# Patient Record
Sex: MALE | Race: WHITE | NOT HISPANIC OR LATINO | Employment: OTHER | URBAN - NONMETROPOLITAN AREA
[De-identification: names, ages, dates, MRNs, and addresses within clinical notes are randomized per-mention and may not be internally consistent; named-entity substitution may affect disease eponyms.]

---

## 2022-08-07 ENCOUNTER — APPOINTMENT (OUTPATIENT)
Dept: GENERAL RADIOLOGY | Facility: HOSPITAL | Age: 82
End: 2022-08-07

## 2022-08-07 ENCOUNTER — HOSPITAL ENCOUNTER (INPATIENT)
Facility: HOSPITAL | Age: 82
LOS: 3 days | Discharge: HOME | End: 2022-08-10
Attending: INTERNAL MEDICINE | Admitting: FAMILY MEDICINE

## 2022-08-07 DIAGNOSIS — Z78.9 IMPAIRED MOBILITY AND ADLS: ICD-10-CM

## 2022-08-07 DIAGNOSIS — R62.7 FAILURE TO THRIVE IN ADULT: ICD-10-CM

## 2022-08-07 DIAGNOSIS — Z74.09 IMPAIRED MOBILITY AND ADLS: ICD-10-CM

## 2022-08-07 DIAGNOSIS — R41.89 IMPAIRED COGNITION: Primary | ICD-10-CM

## 2022-08-07 DIAGNOSIS — Z74.09 IMPAIRED MOBILITY: ICD-10-CM

## 2022-08-07 DIAGNOSIS — K92.2 GASTROINTESTINAL HEMORRHAGE, UNSPECIFIED GASTROINTESTINAL HEMORRHAGE TYPE: ICD-10-CM

## 2022-08-07 PROBLEM — N19 RENAL FAILURE: Status: ACTIVE | Noted: 2022-08-07

## 2022-08-07 PROBLEM — U07.1 LAB TEST POSITIVE FOR DETECTION OF COVID-19 VIRUS: Status: ACTIVE | Noted: 2022-08-07

## 2022-08-07 PROBLEM — E83.42 HYPOMAGNESEMIA: Status: ACTIVE | Noted: 2022-08-07

## 2022-08-07 PROBLEM — Z86.79 HX OF ATRIAL FIBRILLATION, NO CURRENT MEDICATION: Status: ACTIVE | Noted: 2022-08-07

## 2022-08-07 LAB
ALBUMIN SERPL-MCNC: 3.1 G/DL (ref 3.5–5.2)
ALBUMIN/GLOB SERPL: 1 G/DL
ALP SERPL-CCNC: 98 U/L (ref 39–117)
ALT SERPL W P-5'-P-CCNC: 19 U/L (ref 1–41)
ANION GAP SERPL CALCULATED.3IONS-SCNC: 13 MMOL/L (ref 5–15)
AST SERPL-CCNC: 13 U/L (ref 1–40)
B PARAPERT DNA SPEC QL NAA+PROBE: NOT DETECTED
B PERT DNA SPEC QL NAA+PROBE: NOT DETECTED
BACTERIA UR QL AUTO: ABNORMAL /HPF
BASOPHILS # BLD AUTO: 0.04 10*3/MM3 (ref 0–0.2)
BASOPHILS NFR BLD AUTO: 0.2 % (ref 0–1.5)
BILIRUB SERPL-MCNC: 0.4 MG/DL (ref 0–1.2)
BILIRUB UR QL STRIP: NEGATIVE
BUN SERPL-MCNC: 96 MG/DL (ref 8–23)
BUN/CREAT SERPL: 56.8 (ref 7–25)
C PNEUM DNA NPH QL NAA+NON-PROBE: NOT DETECTED
CALCIUM SPEC-SCNC: 9.5 MG/DL (ref 8.6–10.5)
CHLORIDE SERPL-SCNC: 109 MMOL/L (ref 98–107)
CLARITY UR: CLEAR
CO2 SERPL-SCNC: 20 MMOL/L (ref 22–29)
COLOR UR: ABNORMAL
CREAT SERPL-MCNC: 1.69 MG/DL (ref 0.76–1.27)
D-LACTATE SERPL-SCNC: 2.4 MMOL/L (ref 0.5–2)
D-LACTATE SERPL-SCNC: 2.5 MMOL/L (ref 0.5–2)
D-LACTATE SERPL-SCNC: 2.6 MMOL/L (ref 0.5–2)
D-LACTATE SERPL-SCNC: 2.9 MMOL/L (ref 0.5–2)
D-LACTATE SERPL-SCNC: 3.8 MMOL/L (ref 0.5–2)
DEPRECATED RDW RBC AUTO: 43.7 FL (ref 37–54)
EGFRCR SERPLBLD CKD-EPI 2021: 40 ML/MIN/1.73
EOSINOPHIL # BLD AUTO: 0.01 10*3/MM3 (ref 0–0.4)
EOSINOPHIL NFR BLD AUTO: 0.1 % (ref 0.3–6.2)
ERYTHROCYTE [DISTWIDTH] IN BLOOD BY AUTOMATED COUNT: 13.2 % (ref 12.3–15.4)
FLUAV SUBTYP SPEC NAA+PROBE: NOT DETECTED
FLUBV RNA ISLT QL NAA+PROBE: NOT DETECTED
GLOBULIN UR ELPH-MCNC: 3.1 GM/DL
GLUCOSE SERPL-MCNC: 172 MG/DL (ref 65–99)
GLUCOSE UR STRIP-MCNC: NEGATIVE MG/DL
HADV DNA SPEC NAA+PROBE: NOT DETECTED
HCOV 229E RNA SPEC QL NAA+PROBE: NOT DETECTED
HCOV HKU1 RNA SPEC QL NAA+PROBE: NOT DETECTED
HCOV NL63 RNA SPEC QL NAA+PROBE: NOT DETECTED
HCOV OC43 RNA SPEC QL NAA+PROBE: NOT DETECTED
HCT VFR BLD AUTO: 35.2 % (ref 37.5–51)
HGB BLD-MCNC: 11.9 G/DL (ref 13–17.7)
HGB UR QL STRIP.AUTO: NEGATIVE
HMPV RNA NPH QL NAA+NON-PROBE: NOT DETECTED
HPIV1 RNA ISLT QL NAA+PROBE: NOT DETECTED
HPIV2 RNA SPEC QL NAA+PROBE: NOT DETECTED
HPIV3 RNA NPH QL NAA+PROBE: NOT DETECTED
HPIV4 P GENE NPH QL NAA+PROBE: NOT DETECTED
HYALINE CASTS UR QL AUTO: ABNORMAL /LPF
IMM GRANULOCYTES # BLD AUTO: 0.15 10*3/MM3 (ref 0–0.05)
IMM GRANULOCYTES NFR BLD AUTO: 0.9 % (ref 0–0.5)
IRON 24H UR-MRATE: 114 MCG/DL (ref 59–158)
IRON SATN MFR SERPL: 44 % (ref 20–50)
KETONES UR QL STRIP: ABNORMAL
LEUKOCYTE ESTERASE UR QL STRIP.AUTO: ABNORMAL
LYMPHOCYTES # BLD AUTO: 0.86 10*3/MM3 (ref 0.7–3.1)
LYMPHOCYTES NFR BLD AUTO: 5.1 % (ref 19.6–45.3)
M PNEUMO IGG SER IA-ACNC: NOT DETECTED
MAGNESIUM SERPL-MCNC: 1.5 MG/DL (ref 1.6–2.4)
MCH RBC QN AUTO: 31.1 PG (ref 26.6–33)
MCHC RBC AUTO-ENTMCNC: 33.8 G/DL (ref 31.5–35.7)
MCV RBC AUTO: 91.9 FL (ref 79–97)
MONOCYTES # BLD AUTO: 0.76 10*3/MM3 (ref 0.1–0.9)
MONOCYTES NFR BLD AUTO: 4.5 % (ref 5–12)
NEUTROPHILS NFR BLD AUTO: 15.2 10*3/MM3 (ref 1.7–7)
NEUTROPHILS NFR BLD AUTO: 89.2 % (ref 42.7–76)
NITRITE UR QL STRIP: NEGATIVE
NRBC BLD AUTO-RTO: 0 /100 WBC (ref 0–0.2)
PH UR STRIP.AUTO: <=5 [PH] (ref 5–8)
PHOSPHATE SERPL-MCNC: 3.6 MG/DL (ref 2.5–4.5)
PLATELET # BLD AUTO: 215 10*3/MM3 (ref 140–450)
PMV BLD AUTO: 11.6 FL (ref 6–12)
POTASSIUM SERPL-SCNC: 4.7 MMOL/L (ref 3.5–5.2)
PROT SERPL-MCNC: 6.2 G/DL (ref 6–8.5)
PROT UR QL STRIP: NEGATIVE
RBC # BLD AUTO: 3.83 10*6/MM3 (ref 4.14–5.8)
RBC # UR STRIP: ABNORMAL /HPF
REF LAB TEST METHOD: ABNORMAL
RHINOVIRUS RNA SPEC NAA+PROBE: NOT DETECTED
RSV RNA NPH QL NAA+NON-PROBE: NOT DETECTED
SARS-COV-2 RNA NPH QL NAA+NON-PROBE: NOT DETECTED
SODIUM SERPL-SCNC: 142 MMOL/L (ref 136–145)
SP GR UR STRIP: 1.02 (ref 1–1.03)
SQUAMOUS #/AREA URNS HPF: ABNORMAL /HPF
TIBC SERPL-MCNC: 259 MCG/DL (ref 298–536)
TRANSFERRIN SERPL-MCNC: 174 MG/DL (ref 200–360)
TROPONIN T SERPL-MCNC: 0.08 NG/ML (ref 0–0.03)
UROBILINOGEN UR QL STRIP: ABNORMAL
WBC # UR STRIP: ABNORMAL /HPF
WBC NRBC COR # BLD: 17.02 10*3/MM3 (ref 3.4–10.8)

## 2022-08-07 PROCEDURE — 84466 ASSAY OF TRANSFERRIN: CPT | Performed by: INTERNAL MEDICINE

## 2022-08-07 PROCEDURE — 83605 ASSAY OF LACTIC ACID: CPT | Performed by: INTERNAL MEDICINE

## 2022-08-07 PROCEDURE — 83735 ASSAY OF MAGNESIUM: CPT | Performed by: INTERNAL MEDICINE

## 2022-08-07 PROCEDURE — 99222 1ST HOSP IP/OBS MODERATE 55: CPT | Performed by: INTERNAL MEDICINE

## 2022-08-07 PROCEDURE — 83540 ASSAY OF IRON: CPT | Performed by: INTERNAL MEDICINE

## 2022-08-07 PROCEDURE — 84484 ASSAY OF TROPONIN QUANT: CPT | Performed by: INTERNAL MEDICINE

## 2022-08-07 PROCEDURE — 84100 ASSAY OF PHOSPHORUS: CPT | Performed by: INTERNAL MEDICINE

## 2022-08-07 PROCEDURE — 25010000002 MAGNESIUM SULFATE 2 GM/50ML SOLUTION: Performed by: FAMILY MEDICINE

## 2022-08-07 PROCEDURE — 71045 X-RAY EXAM CHEST 1 VIEW: CPT

## 2022-08-07 PROCEDURE — 0202U NFCT DS 22 TRGT SARS-COV-2: CPT | Performed by: FAMILY MEDICINE

## 2022-08-07 PROCEDURE — 85025 COMPLETE CBC W/AUTO DIFF WBC: CPT | Performed by: INTERNAL MEDICINE

## 2022-08-07 PROCEDURE — 87040 BLOOD CULTURE FOR BACTERIA: CPT | Performed by: FAMILY MEDICINE

## 2022-08-07 PROCEDURE — 81001 URINALYSIS AUTO W/SCOPE: CPT | Performed by: FAMILY MEDICINE

## 2022-08-07 PROCEDURE — 80053 COMPREHEN METABOLIC PANEL: CPT | Performed by: INTERNAL MEDICINE

## 2022-08-07 RX ORDER — PANTOPRAZOLE SODIUM 40 MG/10ML
40 INJECTION, POWDER, LYOPHILIZED, FOR SOLUTION INTRAVENOUS 2 TIMES DAILY
Status: DISCONTINUED | OUTPATIENT
Start: 2022-08-07 | End: 2022-08-07

## 2022-08-07 RX ORDER — PANTOPRAZOLE SODIUM 40 MG/10ML
80 INJECTION, POWDER, LYOPHILIZED, FOR SOLUTION INTRAVENOUS EVERY 12 HOURS SCHEDULED
Status: COMPLETED | OUTPATIENT
Start: 2022-08-07 | End: 2022-08-09

## 2022-08-07 RX ORDER — PANTOPRAZOLE SODIUM 40 MG/10ML
40 INJECTION, POWDER, LYOPHILIZED, FOR SOLUTION INTRAVENOUS EVERY 12 HOURS SCHEDULED
Status: DISCONTINUED | OUTPATIENT
Start: 2022-08-10 | End: 2022-08-10 | Stop reason: HOSPADM

## 2022-08-07 RX ORDER — SODIUM CHLORIDE 0.9 % (FLUSH) 0.9 %
10 SYRINGE (ML) INJECTION AS NEEDED
Status: DISCONTINUED | OUTPATIENT
Start: 2022-08-07 | End: 2022-08-10 | Stop reason: HOSPADM

## 2022-08-07 RX ORDER — ONDANSETRON 2 MG/ML
4 INJECTION INTRAMUSCULAR; INTRAVENOUS EVERY 6 HOURS PRN
Status: DISCONTINUED | OUTPATIENT
Start: 2022-08-07 | End: 2022-08-10 | Stop reason: HOSPADM

## 2022-08-07 RX ORDER — METOPROLOL SUCCINATE 100 MG/1
50 TABLET, EXTENDED RELEASE ORAL DAILY
COMMUNITY

## 2022-08-07 RX ORDER — DONEPEZIL HYDROCHLORIDE 10 MG/1
10 TABLET, FILM COATED ORAL NIGHTLY
COMMUNITY

## 2022-08-07 RX ORDER — SODIUM CHLORIDE 0.9 % (FLUSH) 0.9 %
10 SYRINGE (ML) INJECTION EVERY 12 HOURS SCHEDULED
Status: DISCONTINUED | OUTPATIENT
Start: 2022-08-07 | End: 2022-08-10 | Stop reason: HOSPADM

## 2022-08-07 RX ORDER — ACETAMINOPHEN 650 MG/1
650 SUPPOSITORY RECTAL EVERY 4 HOURS PRN
Status: DISCONTINUED | OUTPATIENT
Start: 2022-08-07 | End: 2022-08-10 | Stop reason: HOSPADM

## 2022-08-07 RX ORDER — VENLAFAXINE 75 MG/1
150 TABLET ORAL DAILY
Status: ON HOLD | COMMUNITY
End: 2022-08-09

## 2022-08-07 RX ORDER — LOSARTAN POTASSIUM 100 MG/1
100 TABLET ORAL DAILY
COMMUNITY

## 2022-08-07 RX ORDER — POTASSIUM CHLORIDE 1500 MG/1
20 TABLET, FILM COATED, EXTENDED RELEASE ORAL DAILY
COMMUNITY

## 2022-08-07 RX ORDER — ESCITALOPRAM OXALATE 20 MG/1
10 TABLET ORAL DAILY
COMMUNITY

## 2022-08-07 RX ORDER — SODIUM CHLORIDE, SODIUM LACTATE, POTASSIUM CHLORIDE, CALCIUM CHLORIDE 600; 310; 30; 20 MG/100ML; MG/100ML; MG/100ML; MG/100ML
75 INJECTION, SOLUTION INTRAVENOUS CONTINUOUS
Status: DISCONTINUED | OUTPATIENT
Start: 2022-08-07 | End: 2022-08-10

## 2022-08-07 RX ORDER — PANTOPRAZOLE SODIUM 40 MG/10ML
40 INJECTION, POWDER, LYOPHILIZED, FOR SOLUTION INTRAVENOUS
Status: DISCONTINUED | OUTPATIENT
Start: 2022-08-07 | End: 2022-08-07

## 2022-08-07 RX ORDER — MELATONIN
3000 DAILY
COMMUNITY

## 2022-08-07 RX ORDER — CHLORTHALIDONE 50 MG/1
50 TABLET ORAL DAILY
Status: ON HOLD | COMMUNITY
End: 2022-08-09

## 2022-08-07 RX ORDER — KETOCONAZOLE 20 MG/G
1 CREAM TOPICAL 2 TIMES DAILY
Status: ON HOLD | COMMUNITY
End: 2022-08-09

## 2022-08-07 RX ORDER — ZINC OXIDE 20 %
1 OINTMENT (GRAM) TOPICAL 3 TIMES DAILY PRN
Status: ON HOLD | COMMUNITY
End: 2022-08-09

## 2022-08-07 RX ORDER — ATORVASTATIN CALCIUM 80 MG/1
40 TABLET, FILM COATED ORAL NIGHTLY
COMMUNITY

## 2022-08-07 RX ORDER — PANTOPRAZOLE SODIUM 40 MG/10ML
80 INJECTION, POWDER, LYOPHILIZED, FOR SOLUTION INTRAVENOUS 2 TIMES DAILY
Status: DISCONTINUED | OUTPATIENT
Start: 2022-08-07 | End: 2022-08-07

## 2022-08-07 RX ORDER — MAGNESIUM SULFATE HEPTAHYDRATE 40 MG/ML
2 INJECTION, SOLUTION INTRAVENOUS ONCE
Status: COMPLETED | OUTPATIENT
Start: 2022-08-07 | End: 2022-08-07

## 2022-08-07 RX ADMIN — Medication 10 ML: at 09:39

## 2022-08-07 RX ADMIN — PANTOPRAZOLE SODIUM 80 MG: 40 INJECTION, POWDER, FOR SOLUTION INTRAVENOUS at 20:06

## 2022-08-07 RX ADMIN — MAGNESIUM SULFATE HEPTAHYDRATE 2 G: 2 INJECTION, SOLUTION INTRAVENOUS at 14:39

## 2022-08-07 RX ADMIN — Medication 10 ML: at 20:10

## 2022-08-07 RX ADMIN — SODIUM CHLORIDE, POTASSIUM CHLORIDE, SODIUM LACTATE AND CALCIUM CHLORIDE 75 ML/HR: 600; 310; 30; 20 INJECTION, SOLUTION INTRAVENOUS at 20:06

## 2022-08-07 RX ADMIN — SODIUM CHLORIDE, POTASSIUM CHLORIDE, SODIUM LACTATE AND CALCIUM CHLORIDE 75 ML/HR: 600; 310; 30; 20 INJECTION, SOLUTION INTRAVENOUS at 06:59

## 2022-08-07 RX ADMIN — PANTOPRAZOLE SODIUM 40 MG: 40 INJECTION, POWDER, FOR SOLUTION INTRAVENOUS at 09:39

## 2022-08-07 NOTE — PROGRESS NOTES
[facility] Medicine Services  INPATIENT PROGRESS NOTE    Length of Stay: 0  Date of Admission: 8/7/2022  Primary Care Physician: Provider, No Known    Subjective   Chief Complaint: Altered mental status.    HPI   I am not sure what his baseline is.  Hypotension has resolved.  Patient is afebrile.  Patient not requiring oxygen.  Patient is advised about but go right back to sleep.  Patient does not answer any question.  There is no sign of any acute distress.  We will send patient to unit to watch closely due to history of multiple medical problems.    Review of Systems   Unable to obtain due to altered mental status.    All pertinent negatives and positives are as above. All other systems have been reviewed and are negative unless otherwise stated.     Objective    Temp:  [95.3 °F (35.2 °C)-98.2 °F (36.8 °C)] 98 °F (36.7 °C)  Heart Rate:  [75-94] 86  Resp:  [16] 16  BP: (118-140)/(60-82) 140/78  No intake or output data in the 24 hours ending 08/07/22 1425  Physical Exam  Vitals and nursing note reviewed.   Constitutional:       Comments: Obesity.  Advanced age.  Chronically ill.   HENT:      Head: Normocephalic.   Eyes:      Conjunctiva/sclera: Conjunctivae normal.      Pupils: Pupils are equal, round, and reactive to light.   Neck:      Vascular: No JVD.   Cardiovascular:      Rate and Rhythm: Normal rate and regular rhythm.      Heart sounds: Normal heart sounds.   Pulmonary:      Effort: No respiratory distress.      Breath sounds: No wheezing or rales.      Comments: Diminished breath semilateral, clear .  Chest:      Chest wall: No tenderness.   Abdominal:      General: Bowel sounds are normal. There is no distension.      Palpations: Abdomen is soft.      Tenderness: There is no abdominal tenderness.      Comments: Obesity.     Musculoskeletal:         General: No tenderness or deformity.      Cervical back: Neck supple.   Skin:     General: Skin is warm and dry.       Capillary Refill: Capillary refill takes 2 to 3 seconds.      Findings: No rash.   Neurological:      Cranial Nerves: No cranial nerve deficit.      Motor: Weakness present. No abnormal muscle tone.      Coordination: Coordination abnormal.      Gait: Gait abnormal.      Deep Tendon Reflexes: Reflexes normal.         Results Review:  Lab Results (last 24 hours)     Procedure Component Value Units Date/Time    STAT Lactic Acid, Reflex [[id number]]  (Abnormal) Collected: 08/07/22 1322    Specimen: Blood Updated: 08/07/22 1348     Lactate 2.9 mmol/L     STAT Lactic Acid, Reflex [[id number]]  (Abnormal) Collected: 08/07/22 0914    Specimen: Blood Updated: 08/07/22 0942     Lactate 2.4 mmol/L     Troponin [[id number]]  (Abnormal) Collected: 08/07/22 0633    Specimen: Blood Updated: 08/07/22 0707     Troponin T 0.080 ng/mL     Narrative:      Troponin T Reference Range:  <= 0.03 ng/mL-   Negative for AMI  >0.03 ng/mL-     Abnormal for myocardial necrosis.  Clinicians would have to utilize clinical acumen, EKG, Troponin and serial changes to determine if it is an Acute Myocardial Infarction or myocardial injury due to an underlying chronic condition.       Results may be falsely decreased if patient taking Biotin.      Lactic Acid, Plasma [[id number]]  (Abnormal) Collected: 08/07/22 0633    Specimen: Blood Updated: 08/07/22 0706     Lactate 2.5 mmol/L     Comprehensive Metabolic Panel [[id number]]  (Abnormal) Collected: 08/07/22 0633    Specimen: Blood Updated: 08/07/22 0702     Glucose 172 mg/dL      BUN 96 mg/dL      Creatinine 1.69 mg/dL      Sodium 142 mmol/L      Potassium 4.7 mmol/L      Comment: Slight hemolysis detected by analyzer. Results may be affected.        Chloride 109 mmol/L      CO2 20.0 mmol/L      Calcium 9.5 mg/dL      Total Protein 6.2 g/dL      Albumin 3.10 g/dL      ALT (SGPT) 19 U/L      AST (SGOT) 13 U/L      Comment: Slight hemolysis detected by analyzer. Results may be affected.        Alkaline  Phosphatase 98 U/L      Total Bilirubin 0.4 mg/dL      Globulin 3.1 gm/dL      A/G Ratio 1.0 g/dL      BUN/Creatinine Ratio 56.8     Anion Gap 13.0 mmol/L      eGFR 40.0 mL/min/1.73      Comment: National Kidney Foundation and American Society of Nephrology (ASN) Task Force recommended calculation based on the Chronic Kidney Disease Epidemiology Collaboration (CKD-EPI) equation refit without adjustment for race.       Narrative:      GFR Normal >60  Chronic Kidney Disease <60  Kidney Failure <15      Iron Profile [[id number]]  (Abnormal) Collected: 08/07/22 0633    Specimen: Blood Updated: 08/07/22 0701     Iron 114 mcg/dL      Iron Saturation 44 %      Transferrin 174 mg/dL      TIBC 259 mcg/dL     Phosphorus [[id number]]  (Normal) Collected: 08/07/22 0633    Specimen: Blood Updated: 08/07/22 0701     Phosphorus 3.6 mg/dL     Magnesium [[id number]]  (Abnormal) Collected: 08/07/22 0633    Specimen: Blood Updated: 08/07/22 0701     Magnesium 1.5 mg/dL     CBC & Differential [[id number]]  (Abnormal) Collected: 08/07/22 0633    Specimen: Blood Updated: 08/07/22 0641    Narrative:      The following orders were created for panel order CBC & Differential.  Procedure                               Abnormality         Status                     ---------                               -----------         ------                     CBC Auto Differential[[id number]]        Abnormal            Final result                 Please view results for these tests on the individual orders.    CBC Auto Differential [[id number]]  (Abnormal) Collected: 08/07/22 0633    Specimen: Blood Updated: 08/07/22 0641     WBC 17.02 10*3/mm3      RBC 3.83 10*6/mm3      Hemoglobin 11.9 g/dL      Hematocrit 35.2 %      MCV 91.9 fL      MCH 31.1 pg      MCHC 33.8 g/dL      RDW 13.2 %      RDW-SD 43.7 fl      MPV 11.6 fL      Platelets 215 10*3/mm3      Neutrophil % 89.2 %      Lymphocyte % 5.1 %      Monocyte % 4.5 %      Eosinophil % 0.1 %      Basophil %  0.2 %      Immature Grans % 0.9 %      Neutrophils, Absolute 15.20 10*3/mm3      Lymphocytes, Absolute 0.86 10*3/mm3      Monocytes, Absolute 0.76 10*3/mm3      Eosinophils, Absolute 0.01 10*3/mm3      Basophils, Absolute 0.04 10*3/mm3      Immature Grans, Absolute 0.15 10*3/mm3      nRBC 0.0 /100 WBC            Cultures:  No results found for: BLOODCX, URINECX, WOUNDCX, MRSACX, RESPCX, STOOLCX    Radiology Data:    Imaging Results (Last 24 Hours)     Procedure Component Value Units Date/Time    XR Chest 1 View [[id number]] Collected: 08/07/22 1404     Updated: 08/07/22 1408    Narrative:      EXAMINATION: XR CHEST 1 VW- 8/7/2022 2:04 PM CDT     HISTORY: covid positive.     REPORT: A frontal view of the chest was obtained.     COMPARISON: There are no correlative imaging studies for comparison.     Lungs are moderately hypoaerated, no infiltrate is identified. There is  borderline cardiomegaly and no evidence of CHF. No pneumothorax or  pleural effusion is identified. The osseous structures and upper abdomen  appear unremarkable.       Impression:      Pulmonary hypoventilation, no acute infiltrates.  This report was finalized on 08/07/2022 14:05 by Dr. Reuben Ridley MD.          Allergies   Allergen Reactions   • Ciprofloxacin Unknown - Low Severity   • Lisinopril Unknown - Low Severity   • Sulfa Antibiotics Unknown - Low Severity   • Terazosin Unknown - Low Severity       Scheduled meds:   magnesium sulfate, 2 g, Intravenous, Once  pantoprazole, 80 mg, Intravenous, Q12H   Followed by  [START ON 8/10/2022] pantoprazole, 40 mg, Intravenous, Q12H  sodium chloride, 10 mL, Intravenous, Q12H        PRN meds:  •  acetaminophen  •  ondansetron  •  sodium chloride    Assessment/Plan       GI bleed    Renal failure    Hx of atrial fibrillation, no current medication    Hypomagnesemia    Failure to thrive in adult    Lab test positive for detection of COVID-19 virus      Plan:  GI bleed.  Hemoccult positive at transferring  outlying facility.  IV Protonix.  GI consult.  Zofran as needed.    Hypotension.  Resolved.  Slow IV hydration    Renal insufficiency.  Creatinine is 1.69.  Slow IV hydration.    COVID-19-positive.  Patient was admitted on the 16th of July for COVID, patient that there went to rehab, patient went home after. Patient could have no respiratory symptoms.  Chest x-ray-pending.  Patient is on room air.    History of atrial fibrillation/hypertension/hyperlipidemia.  On Eliquis at home.  Hold Eliquis for now.  Hold blood pressure medication for now due to hypotension.  Hold Lipitor due to altered mental status.    Hypomagnesia. 2 g magnesium.  Magnesium in AM.    History of stroke.     History of dementia.    Previous COVID-positive July 16.  Patient still tested positive.  Currently no respiratory symptoms COVID   Patient is currently on room air.    Advanced age.  81 years old.    Nutrition.  N.p.o. for now.  Aspiration precaution.  Speech to evaluate    Deconditioning.  PT and OT consult.  Patient is mostly bedbound last 2 weeks.  Previously patient gets around with a walker.    Urinalysis pending.  Blood cultures pending.  Respiratory PCR.     Discharge Planning: Transfer from the [facility].  Retired .   consult for placement.  Palliative care consult.  Patient is from home.     Electronically signed by Hema Villa MD, 08/07/22, 8:18 AM CDT.

## 2022-08-07 NOTE — CASE MANAGEMENT/SOCIAL WORK
Discharge Planning Assessment  [facility]     Patient Name: Bayron Coleman  MRN: [id number]  Today's Date: 8/7/2022    Admit Date: 8/7/2022     Discharge Needs Assessment     Row Name 08/07/22 1426       Living Environment    People in Home spouse    Current Living Arrangements home    Primary Care Provided by spouse/significant other    Provides Primary Care For no one, unable/limited ability to care for self    Family Caregiver if Needed spouse    Quality of Family Relationships helpful;involved;supportive    Able to Return to Prior Arrangements yes       Resource/Environmental Concerns    Resource/Environmental Concerns none       Transition Planning    Patient/Family Anticipates Transition to home with family;home with help/services    Patient/Family Anticipated Services at Transition home health care    Transportation Anticipated family or friend will provide       Discharge Needs Assessment    Readmission Within the Last 30 Days no previous admission in last 30 days    Equipment Currently Used at Home wheelchair;walker, standard;commode;cane, straight;hospital bed    Concerns to be Addressed denies needs/concerns at this time    Discharge Coordination/Progress Spoke with pt's spouse Flower [phone] to discuss d/c needs. Pt lives at home with her. She plans on him returning home at d/c. She says she has caret[facility] in the [facility] 3 days a week [facility]. The nurse, , and doctor also comes to the house when needed. The number for the [facility] (per the wife) is [phone]. Will need to call prior to d/c to get a fax number to send the d/c summary to at d/c.               Discharge Plan    No documentation.               Continued Care and Services - Admitted Since 8/7/2022    Coordination has not been started for this encounter.          Demographic Summary    No documentation.                Functional Status    No documentation.                Psychosocial    No  documentation.                Abuse/Neglect    No documentation.                Legal    No documentation.                Substance Abuse    No documentation.                Patient Forms    No documentation.                   FLORI Salas

## 2022-08-07 NOTE — CONSULTS
Pt is alert but disoriented and somnolent and unable to give history. Here for GIB. Wife states he had an episode of AMS and fecal incontinence. [facility]. MRI suggests a new CVA and TnI is elevated. Hb ~10. Wife states he had a colonoscopy but no EGD. Takes ASA and eliquis for afib abd CVA.   On exam somnolent arousable disoriented, low O2 sat on monitor but breathing comfortably. Rales IRR abd benign no mass non tender MJ melena no masses no edema.    Imp: Possible ulcer or esophagitis no evidence of cirrhosis. Will give a PPI 80 BID x 3 days then BID. EGD, once cleared by neuro/cardiology services. Monitor CBC. Short half life anticoagulation, such as unfractionated heparin may be used if indicated by neuro or cards, with CBC monitoring and transfusions to Hb ~9. Hold off anticoagulant if overt bleeding occurs. D/w patient's wife. Dr Pal will assess for EGD tomorrow.   The risk of the endoscopy were discussed with his wife in detail.  We discussed the risk of perforation (one out of 8581-6908, riskier with dilation), bleeding (one out of 500), and the rare risks of infection, adverse reaction to anesthesia, respiratory failure, cardiac failure including MI and adverse reaction to medications, etc.  We discussed consequences that could occur if a risk were to develop such as the need for hospitalization, blood transfusion, surgical intervention, medications, pain and disability and death.  Alternatives include not doing anything, or pursuing an UGI series which only offers a diagnosis with potential less accuracy compared to egd.

## 2022-08-07 NOTE — H&P
[facility] Medicine Services  HISTORY AND PHYSICAL    Date of Admission: 8/7/2022  Primary Care Physician: Provider, No Known    Subjective     Chief Complaint: GI bleed/Generalized weakness    History of Present Illness  81-year-old male who presents in transfer from [facility].  They did not have GI services.  Patient is noted to be a full code.  He is a retired  with past medical history of atrial fibrillation on Eliquis, CVA, and dementia.  History is taken by transferring EDMD, as the patient is currently nonverbal and not giving history, and his wife is not present.  Patient was noted to be lethargic.  He was not getting up much.  He was not eating and had generalized weakness.  He had to bloody stools in his depends.  He had 1 bloody stool transferring facility.  He had one noted by our nursing staff at our facility.  The patient was subsequently diagnosed with COVID on July 16.  He is still testing positive.  He was occult blood positive at transferring facility.  He was given 500 mL bolus secondary to some soft blood pressures.    Labs from transferring facility:  BUN 69 creatinine 1.4  July 21 hemoglobin was 14.3 and today's labs hemoglobin was 12.0 with hematocrit of 39  Lactic of 2.79  Platelets 248      Review of Systems   Generalized weakness  Rectal bleeding  Ambulates with rolling walker    Otherwise complete ROS reviewed and negative except as mentioned in the HPI.    Past Medical History:   Past Medical History:   Diagnosis Date   • Adjustment disorder with withdrawal    • Cardiomyopathy (HCC)    • Carpal tunnel syndrome    • Cerebral infarction (HCC)    • COVID-19    • Dementia (HCC)    • Elevated PSA    • Elevated PSA    • Hyperlipidemia    • Hypertension    • Major depressive disorder    • Osteoarthritis    • Paroxysmal atrial fibrillation (HCC)    • Vitiligo    • Weakness      Past Surgical History:History reviewed. No pertinent surgical  history.  Social History:  reports previous alcohol use. He reports previous drug use.    Family History: Unobtainable from patient.     Allergies:  Allergies   Allergen Reactions   • Ciprofloxacin Unknown - Low Severity   • Lisinopril Unknown - Low Severity   • Sulfa Antibiotics Unknown - Low Severity   • Terazosin Unknown - Low Severity       Medications:  Prior to Admission medications    Medication Sig Start Date End Date Taking? Authorizing Provider   apixaban (ELIQUIS) 5 MG tablet tablet Take 5 mg by mouth 2 (Two) Times a Day.    Anastacia Traore MD   atorvastatin (LIPITOR) 40 MG tablet Take 40 mg by mouth Every Night.    Anastacia Traore MD   chlorthalidone (HYGROTEN) 50 MG tablet Take 50 mg by mouth Daily.    Anastacia Traore MD   cholecalciferol (VITAMIN D3) 25 MCG (1000 UT) tablet Take 3,000 Units by mouth Daily.    Anastacia Traore MD   donepezil (ARICEPT) 10 MG tablet Take 10 mg by mouth Every Night.    Anastacia Traore MD   escitalopram (LEXAPRO) 20 MG tablet Take 10 mg by mouth Daily.    Anastacia Traore MD   ketoconazole (NIZORAL) 2 % cream Apply 1 application topically to the appropriate area as directed 2 (Two) Times a Day.    Anastacia Traore MD   losartan (COZAAR) 50 MG tablet Take 100 mg by mouth Daily.    Anastacia Traore MD   metoprolol succinate XL (TOPROL-XL) 100 MG 24 hr tablet Take 50 mg by mouth Daily.    Anastacia Traore MD   potassium chloride 10 MEQ CR tablet Take 20 mEq by mouth Daily.    Anastacia Traore MD   venlafaxine (EFFEXOR) 75 MG tablet Take 150 mg by mouth Daily.    Anastacia Traore MD   zinc oxide 20 % ointment Apply 1 application topically to the appropriate area as directed 3 (Three) Times a Day As Needed.    Anastacia Traore MD     I have utilized all available immediate resources to obtain, update, and review the patient's current medications.    Objective     Vital Signs: /80 (BP Location: Right arm,  Patient Position: Lying)   Pulse 89   Temp 98.2 °F (36.8 °C) (Oral)   Resp 16   Wt 117 kg (258 lb 11.2 oz)   SpO2 97%   Physical Exam  Vitals reviewed.   Constitutional:       Appearance: He is ill-appearing.      Comments: Smells of GI bleed.    HENT:      Head: Normocephalic and atraumatic.      Right Ear: External ear normal.      Left Ear: External ear normal.      Nose: Nose normal.   Eyes:      General: No scleral icterus.     Conjunctiva/sclera: Conjunctivae normal.   Cardiovascular:      Rate and Rhythm: Normal rate and regular rhythm.      Heart sounds: Normal heart sounds.   Pulmonary:      Effort: Pulmonary effort is normal.      Breath sounds: Normal breath sounds.   Abdominal:      General: There is no distension.      Palpations: Abdomen is soft.   Musculoskeletal:         General: No tenderness.      Cervical back: Normal range of motion and neck supple.   Skin:     General: Skin is warm and dry.      Comments: Sores on bilateral feet.    Neurological:      Mental Status: He is alert. He is disoriented.      Comments: Patient awakens but did not verbally respond or follow directions.    Psychiatric:      Comments: Not tracking and minimally responsive.         Results Reviewed:  Lab Results (last 24 hours)     ** No results found for the last 24 hours. **        Imaging Results (Last 24 Hours)     ** No results found for the last 24 hours. **        I have personally reviewed and interpreted the radiology studies and ECG obtained at time of admission.     Assessment / Plan     Assessment:   Active Hospital Problems    Diagnosis    • GI bleed      Impression:  1. GI bleed  2. A fib on chronic Eliquis  3.  Advanced age with generalized weakness and chronic ambulatory dysfunction  4.  COVID-19  5.  Mild hypotension with history of hypertension    Plan:   1.  Admit to the hospital  2.  NPO, hold Eliquis  3.  Stat labs to include CBC and CMP  4.  Fall/skin/aspiration precautions  5.  PT/OT/speech  consults  6.  Palliative care consult  7.   consult, question need for placement  8.  Protonix IV  9.  GI consult  10.  Gentle IV hydration      Code Status/Advanced Care Plan: Full    The patient's surrogate decision maker is wife.     I discussed my findings and recommendations with the staff.    Estimated length of stay is 2 to 3 days.     The patient was seen and examined by me on 8/7/2022 at seen after midnight.    Electronically signed by Radha Sal DO, 08/07/22, 05:34 CDT.

## 2022-08-08 LAB
ANION GAP SERPL CALCULATED.3IONS-SCNC: 15 MMOL/L (ref 5–15)
BASOPHILS # BLD AUTO: 0.03 10*3/MM3 (ref 0–0.2)
BASOPHILS NFR BLD AUTO: 0.2 % (ref 0–1.5)
BUN SERPL-MCNC: 95 MG/DL (ref 8–23)
BUN/CREAT SERPL: 49.7 (ref 7–25)
CALCIUM SPEC-SCNC: 9.2 MG/DL (ref 8.6–10.5)
CHLORIDE SERPL-SCNC: 109 MMOL/L (ref 98–107)
CHOLEST SERPL-MCNC: 88 MG/DL (ref 0–200)
CO2 SERPL-SCNC: 21 MMOL/L (ref 22–29)
CORTIS SERPL-MCNC: 29.2 MCG/DL
CREAT SERPL-MCNC: 1.91 MG/DL (ref 0.76–1.27)
DEPRECATED RDW RBC AUTO: 48.1 FL (ref 37–54)
EGFRCR SERPLBLD CKD-EPI 2021: 34.6 ML/MIN/1.73
EOSINOPHIL # BLD AUTO: 0.02 10*3/MM3 (ref 0–0.4)
EOSINOPHIL NFR BLD AUTO: 0.1 % (ref 0.3–6.2)
ERYTHROCYTE [DISTWIDTH] IN BLOOD BY AUTOMATED COUNT: 13.7 % (ref 12.3–15.4)
GLUCOSE SERPL-MCNC: 142 MG/DL (ref 65–99)
HBA1C MFR BLD: 6.3 % (ref 4.8–5.6)
HCT VFR BLD AUTO: 29.7 % (ref 37.5–51)
HDLC SERPL-MCNC: 29 MG/DL (ref 40–60)
HGB BLD-MCNC: 9.3 G/DL (ref 13–17.7)
IMM GRANULOCYTES # BLD AUTO: 0.14 10*3/MM3 (ref 0–0.05)
IMM GRANULOCYTES NFR BLD AUTO: 0.9 % (ref 0–0.5)
LDLC SERPL CALC-MCNC: 41 MG/DL (ref 0–100)
LDLC/HDLC SERPL: 1.43 {RATIO}
LYMPHOCYTES # BLD AUTO: 0.76 10*3/MM3 (ref 0.7–3.1)
LYMPHOCYTES NFR BLD AUTO: 4.9 % (ref 19.6–45.3)
MAGNESIUM SERPL-MCNC: 1.8 MG/DL (ref 1.6–2.4)
MCH RBC QN AUTO: 30.5 PG (ref 26.6–33)
MCHC RBC AUTO-ENTMCNC: 31.3 G/DL (ref 31.5–35.7)
MCV RBC AUTO: 97.4 FL (ref 79–97)
MONOCYTES # BLD AUTO: 0.63 10*3/MM3 (ref 0.1–0.9)
MONOCYTES NFR BLD AUTO: 4 % (ref 5–12)
NEUTROPHILS NFR BLD AUTO: 14.02 10*3/MM3 (ref 1.7–7)
NEUTROPHILS NFR BLD AUTO: 89.9 % (ref 42.7–76)
NRBC BLD AUTO-RTO: 0 /100 WBC (ref 0–0.2)
PLATELET # BLD AUTO: 216 10*3/MM3 (ref 140–450)
PMV BLD AUTO: 12.5 FL (ref 6–12)
POTASSIUM SERPL-SCNC: 4 MMOL/L (ref 3.5–5.2)
RBC # BLD AUTO: 3.05 10*6/MM3 (ref 4.14–5.8)
SODIUM SERPL-SCNC: 145 MMOL/L (ref 136–145)
TRIGL SERPL-MCNC: 88 MG/DL (ref 0–150)
TSH SERPL DL<=0.05 MIU/L-ACNC: 2.48 UIU/ML (ref 0.27–4.2)
VLDLC SERPL-MCNC: 18 MG/DL (ref 5–40)
WBC NRBC COR # BLD: 15.6 10*3/MM3 (ref 3.4–10.8)

## 2022-08-08 PROCEDURE — 99233 SBSQ HOSP IP/OBS HIGH 50: CPT | Performed by: NURSE PRACTITIONER

## 2022-08-08 PROCEDURE — 82533 TOTAL CORTISOL: CPT | Performed by: FAMILY MEDICINE

## 2022-08-08 PROCEDURE — 99497 ADVNCD CARE PLAN 30 MIN: CPT

## 2022-08-08 PROCEDURE — 97162 PT EVAL MOD COMPLEX 30 MIN: CPT | Performed by: PHYSICAL THERAPIST

## 2022-08-08 PROCEDURE — 83036 HEMOGLOBIN GLYCOSYLATED A1C: CPT | Performed by: FAMILY MEDICINE

## 2022-08-08 PROCEDURE — 97530 THERAPEUTIC ACTIVITIES: CPT | Performed by: OCCUPATIONAL THERAPIST

## 2022-08-08 PROCEDURE — 97166 OT EVAL MOD COMPLEX 45 MIN: CPT | Performed by: OCCUPATIONAL THERAPIST

## 2022-08-08 PROCEDURE — 83735 ASSAY OF MAGNESIUM: CPT | Performed by: FAMILY MEDICINE

## 2022-08-08 PROCEDURE — 80061 LIPID PANEL: CPT | Performed by: FAMILY MEDICINE

## 2022-08-08 PROCEDURE — 99223 1ST HOSP IP/OBS HIGH 75: CPT

## 2022-08-08 PROCEDURE — 80048 BASIC METABOLIC PNL TOTAL CA: CPT | Performed by: FAMILY MEDICINE

## 2022-08-08 PROCEDURE — 92523 SPEECH SOUND LANG COMPREHEN: CPT

## 2022-08-08 PROCEDURE — 84443 ASSAY THYROID STIM HORMONE: CPT | Performed by: FAMILY MEDICINE

## 2022-08-08 PROCEDURE — 85025 COMPLETE CBC W/AUTO DIFF WBC: CPT | Performed by: FAMILY MEDICINE

## 2022-08-08 PROCEDURE — 99232 SBSQ HOSP IP/OBS MODERATE 35: CPT | Performed by: NURSE PRACTITIONER

## 2022-08-08 RX ORDER — ESCITALOPRAM OXALATE 10 MG/1
10 TABLET ORAL DAILY
Status: DISCONTINUED | OUTPATIENT
Start: 2022-08-08 | End: 2022-08-10 | Stop reason: HOSPADM

## 2022-08-08 RX ORDER — ATORVASTATIN CALCIUM 40 MG/1
40 TABLET, FILM COATED ORAL NIGHTLY
Status: DISCONTINUED | OUTPATIENT
Start: 2022-08-08 | End: 2022-08-10 | Stop reason: HOSPADM

## 2022-08-08 RX ORDER — DONEPEZIL HYDROCHLORIDE 10 MG/1
10 TABLET, FILM COATED ORAL NIGHTLY
Status: DISCONTINUED | OUTPATIENT
Start: 2022-08-08 | End: 2022-08-10 | Stop reason: HOSPADM

## 2022-08-08 RX ADMIN — DONEPEZIL HYDROCHLORIDE 10 MG: 10 TABLET, FILM COATED ORAL at 21:17

## 2022-08-08 RX ADMIN — ATORVASTATIN CALCIUM 40 MG: 40 TABLET, FILM COATED ORAL at 21:17

## 2022-08-08 RX ADMIN — SODIUM CHLORIDE, POTASSIUM CHLORIDE, SODIUM LACTATE AND CALCIUM CHLORIDE 75 ML/HR: 600; 310; 30; 20 INJECTION, SOLUTION INTRAVENOUS at 21:16

## 2022-08-08 RX ADMIN — PANTOPRAZOLE SODIUM 80 MG: 40 INJECTION, POWDER, FOR SOLUTION INTRAVENOUS at 21:17

## 2022-08-08 RX ADMIN — PANTOPRAZOLE SODIUM 80 MG: 40 INJECTION, POWDER, FOR SOLUTION INTRAVENOUS at 08:39

## 2022-08-08 RX ADMIN — SODIUM CHLORIDE, POTASSIUM CHLORIDE, SODIUM LACTATE AND CALCIUM CHLORIDE 75 ML/HR: 600; 310; 30; 20 INJECTION, SOLUTION INTRAVENOUS at 08:39

## 2022-08-08 RX ADMIN — Medication 10 ML: at 08:39

## 2022-08-08 RX ADMIN — Medication 10 ML: at 21:20

## 2022-08-08 RX ADMIN — ESCITALOPRAM 10 MG: 10 TABLET, FILM COATED ORAL at 16:20

## 2022-08-08 NOTE — CASE MANAGEMENT/SOCIAL WORK
Continued Stay Note   [facility]     Patient Name: Bayron Coleman  MRN: [id number]  Today's Date: 8/8/2022    Admit Date: 8/7/2022     Discharge Plan     Row Name 08/08/22 1026       Plan    Plan CM spoke with [facility] who had [facility] and [id number]  Seth FERRERA utilization review nurse notified.               Discharge Codes    No documentation.               Expected Discharge Date and Time     Expected Discharge Date Expected Discharge Time    Aug 10, 2022             Lucia Esqueda RN

## 2022-08-08 NOTE — PROGRESS NOTES
[facility]  Inpatient Progress Note      Date of Admission: 8/7/2022  Date of Service:  08/08/22    Reason for Follow Up: Heme positive stool    Subjective     Subjective:     Patient lying in bed, no family present at bedside.  Patient is alert and responds yes or no to simple questions but is drowsy and quickly goes back to sleep.  Nursing is present in the room and denies signs of GI blood loss.  Patient denied difficulty with heartburn or indigestion on an outpatient basis.  Today he specifically denies abdominal pain.  He denied nausea.  No dysphagia.    Current Facility-Administered Medications:   •  acetaminophen (TYLENOL) suppository 650 mg, 650 mg, Rectal, Q4H PRN, Radha Sal DO  •  lactated ringers infusion, 75 mL/hr, Intravenous, Continuous, Radha Sal DO, Last Rate: 75 mL/hr at 08/08/22 0839, 75 mL/hr at 08/08/22 0839  •  ondansetron (ZOFRAN) injection 4 mg, 4 mg, Intravenous, Q6H PRN, Radha Sal DO  •  pantoprazole (PROTONIX) injection 80 mg, 80 mg, Intravenous, Q12H, 80 mg at 08/08/22 0839 **FOLLOWED BY** [START ON 8/10/2022] pantoprazole (PROTONIX) injection 40 mg, 40 mg, Intravenous, Q12H, Kevin Hernandez MD  •  sodium chloride 0.9 % flush 10 mL, 10 mL, Intravenous, Q12H, Radha Sal DO, 10 mL at 08/08/22 0839  •  sodium chloride 0.9 % flush 10 mL, 10 mL, Intravenous, PRN, Radha Sal DO    Review of Systems   Unable to obtain complete review of system due to patient's drowsiness      Objective     Vital Signs  Temp:  [96.9 °F (36.1 °C)-98 °F (36.7 °C)] 97.8 °F (36.6 °C)  Heart Rate:  [] 102  Resp:  [16-18] 18  BP: (100-148)/(50-80) 104/60  There is no height or weight on file to calculate BMI.    Intake/Output Summary (Last 24 hours) at 8/8/2022 1148  Last data filed at 8/8/2022 0628  Gross per 24 hour   Intake 935 ml   Output --   Net 935 ml     No intake/output data recorded.       Physical Exam:   General: Drowsy,  ill-appearing   Eyes: Normal lids and lashes, no scleral icterus   Neck: supple, normal ROM   Skin: warm and dry, not jaundiced   Cardiovascular: regular rhythm and rate, no murmurs auscultated   Pulm: clear to auscultation bilaterally, regular and unlabored   Abdomen: soft, nontender, nondistended; normal bowel sounds   Rectal: deferred   Extremities: no rash or edema   Psychiatric: Flat affect; patient only responded yes or no to questions and became upset with more complex questions         Results Review:    I have reviewed all of the patients current test results  Results from last 7 days   Lab Units 08/08/22  0748 08/07/22  0633   WBC 10*3/mm3 15.60* 17.02*   HEMOGLOBIN g/dL 9.3* 11.9*   HEMATOCRIT % 29.7* 35.2*   PLATELETS 10*3/mm3 216 215       Results from last 7 days   Lab Units 08/07/22  0633   SODIUM mmol/L 142   POTASSIUM mmol/L 4.7   CHLORIDE mmol/L 109*   CO2 mmol/L 20.0*   BUN mg/dL 96*   CREATININE mg/dL 1.69*   CALCIUM mg/dL 9.5   BILIRUBIN mg/dL 0.4   ALK PHOS U/L 98   ALT (SGPT) U/L 19   AST (SGOT) U/L 13   GLUCOSE mg/dL 172*             No results found for: LIPASE    Radiology:    Imaging Results (Last 24 Hours)     Procedure Component Value Units Date/Time    XR Chest 1 View [[id number]] Collected: 08/07/22 1404     Updated: 08/07/22 1408    Narrative:      EXAMINATION: XR CHEST 1 VW- 8/7/2022 2:04 PM CDT     HISTORY: covid positive.     REPORT: A frontal view of the chest was obtained.     COMPARISON: There are no correlative imaging studies for comparison.     Lungs are moderately hypoaerated, no infiltrate is identified. There is  borderline cardiomegaly and no evidence of CHF. No pneumothorax or  pleural effusion is identified. The osseous structures and upper abdomen  appear unremarkable.       Impression:      Pulmonary hypoventilation, no acute infiltrates.  This report was finalized on 08/07/2022 14:05 by Dr. Reuben Ridley MD.            Assessment & Plan     Patient Active Problem  List   Diagnosis Code   • GI bleed K92.2   • Renal failure N19   • Hx of atrial fibrillation, no current medication Z86.79   • Hypomagnesemia E83.42   • Failure to thrive in adult R62.7   • Lab test positive for detection of COVID-19 virus U07.1       Impression/Plan    Heme positive stool  Anemia  Leukocytosis  Eliquis    Hemoglobin has trended downward.  No signs of active GI blood loss.  Eliquis was not continued upon hospitalization.  If family is agreeable could potentially proceed with EGD tomorrow for further evaluation.  Due to documented dementia will likely need wife's approval for consent.  I will hold patient to be n.p.o. after midnight as a precaution.  Further orders and recommendation pending patient's progress as well as assessment with Dr. Pal.      Electronically signed by LISSETTE Shankar, 08/08/22, 11:48 AM CDT.       LISSETTE Shankar  08/08/22  11:48 CDT

## 2022-08-08 NOTE — NURSING NOTE
Talked with Tano Cottrell (Infecton Control Nurse) and OK'd to remove from isolation precautions.    First positive at outlying facility 7/16, no symptoms and negative test today per respiratory panel.

## 2022-08-08 NOTE — PLAN OF CARE
Goal Outcome Evaluation:  Plan of Care Reviewed With: patient, other (see comments)        Progress: no change  Outcome Evaluation: Initial nutrition assessment. Pt is NPO r/t GI bleed. Plans for EGD on 8/9. Pt with JO secondary to GIB. Will follow for POC and initiation of po diet.

## 2022-08-08 NOTE — THERAPY EVALUATION
"Acute Care - Speech Language Pathology Initial Evaluation  [facility]     Patient Name: Bayron Coleman  : 1940  MRN: [id number]  Today's Date: 2022               Admit Date: 2022  Attempted SLUMS:  The [facility] Mental Status Examination (SLUMS) is designed as an alternative to the Mini-Mental Status Examination. Both tools are sensitive to detecting dementia, but the SLUMS has been shown to better identify mild cognitive impairment. It consists of 11 items and measures orientation, short-term memory, calculations, naming of animals, clock drawing, and recognition of geometric figures.  [facility] Mental Status Examination (SLUMS) findings:      Score Score Range Rating   Results 4 1-20 Indicative of Dementia   Comments: SLP attempted to administer the SLUMS, but was unable to complete every portion due to pt's lethargy and poor motivation. He correctly stated the day of the week and was able to name five items in a concrete category. He was not oriented to the year or state. He was unable to answer a word problem involving simple money math. He was unable to recall five items with a short delay. He was unable to complete a mental manipulation task with number lists. He did follow a one step command to draw an X in a specified shape, but was unable to indicate which of three different shapes was the largest. During one task item the pt actually stated \"I just don't want to do it.\" His score is likely not an accurate representation of his abilities considering his lethargy and motivation. Unsure of pt's baseline, as there was no family present. SLP will  for therapy, but will sign off if it is determined that the pt appears to be at baseline.   Rheana Lazcano, CCC-SLP 2022 09:49 CDT     Visit Dx:    ICD-10-CM ICD-9-CM   1. Impaired cognition  R41.89 294.9     Patient Active Problem List   Diagnosis   • GI bleed   • Renal failure   • Hx of atrial fibrillation, no current " medication   • Hypomagnesemia   • Failure to thrive in adult   • Lab test positive for detection of COVID-19 virus     Past Medical History:   Diagnosis Date   • Adjustment disorder with withdrawal    • Cardiomyopathy (HCC)    • Carpal tunnel syndrome    • Cerebral infarction (HCC)    • COVID-19    • Dementia (HCC)    • Elevated PSA    • Elevated PSA    • Hyperlipidemia    • Hypertension    • Major depressive disorder    • Osteoarthritis    • Paroxysmal atrial fibrillation (HCC)    • Vitiligo    • Weakness      History reviewed. No pertinent surgical history.    SLP Recommendation and Plan  SLP Diagnosis: Impaired cognition (08/08/22 0906)        SLC Criteria for Skilled Therapy Interventions Met: yes, other (see comments) (Baseline currently unknown) (08/08/22 0906)  Anticipated Discharge Disposition (SLP): home with /7 care, skilled nursing facility (08/08/22 0906)        Predicted Duration Therapy Intervention (Days): until discharge (08/08/22 0906)                    Plan of Care Reviewed With: patient (08/08/22 0942)  Outcome Evaluation: See note (08/08/22 0942)      SLP EVALUATION (last 72 hours)     SLP SLC Evaluation     Row Name 08/08/22 0906                   Communication Assessment/Intervention    Document Type evaluation  -MB        Subjective Information complains of;fatigue  -MB        Patient Observations lethargic  -MB        Patient/Family/Caregiver Comments/Observations No family present  -MB                  General Information    Patient Profile Reviewed yes  -MB        Pertinent History Of Current Problem GI bleed, recent COVID, mild hypotension. Hx a-fib, CVA, dementia.  -MB        Precautions/Limitations, Vision WFL;for purposes of eval  -MB        Precautions/Limitations, Hearing WFL;for purposes of eval  -MB        Patient Level of Education Unknown  -MB        Prior Level of Function-Communication cognitive-linguistic impairment  -MB        Plans/Goals Discussed with patient  -MB         Barriers to Rehab cognitive status  -MB        Patient's Goals for Discharge return to home  -MB        Standardized Assessment Used SLUMS  -MB                  Pain    Additional Documentation Pain Scale: FACES Pre/Post-Treatment (Group)  -MB                  Pain Scale: FACES Pre/Post-Treatment    Pain: FACES Scale, Pretreatment 0-->no hurt  -MB                  Standardized Tests    Cognitive/Memory Tests SLUMS: [facility] Mental Status Examination  -MB                  SLUMS: [facility] Status Examination    SLUMS Score 4  -MB        SLUMS Range 1-19: Dementia (Less than High school edcuation)  -MB                  SLP Evaluation Clinical Impressions    SLP Diagnosis Impaired cognition  -MB        Rehab Potential/Prognosis guarded  -MB        SLC Criteria for Skilled Therapy Interventions Met yes;other (see comments)  Baseline currently unknown  -MB        Functional Impact functional impact in social situations;functional impact in ADLs;unable to make medical decisions;unable to care for self;needs 24 hour supervision;decreased ability to respond to situations safely;Poor Judgement  -MB                  Recommendations    Therapy Frequency (SLP SLC) PRN  -MB        Predicted Duration Therapy Intervention (Days) until discharge  -MB        Anticipated Discharge Disposition (SLP) home with 24/7 care;skilled nursing facility  -MB                  Communication Treatment Objective and Progress Goals (SLP)    Cognitive Linguistic Treatment Objectives Cognitive Linguistic Treatment Objectives (Group)  -MB                  Cognitive Linguistic Treatment Objectives    Attention Selection attention, SLP goal 1  -MB        Orientation Selection orientation, SLP goal 1  -MB        Memory Skills Selection memory skills, SLP goal 1  -MB                  Attention Goal 1 (SLP)    Improve Attention by Goal 1 (SLP) attending to task;complete sustained attention task;80%;with minimal cues (75-90%)   -MB        Time Frame (Attention Goal 1, SLP) short term goal (STG);by discharge  -MB                  Orientation Goal 1 (SLP)    Improve Orientation Through Goal 1 (SLP) demonstrating orientation to year;80%;with minimal cues (75-90%)  -MB        Time Frame (Orientation Goal 1, SLP) short term goal (STG);by discharge  -MB                  Memory Skills Goal 1 (SLP)    Improve Memory Skills Through Goal 1 (SLP) use external memory aid;use written schedule;use memory strategies;recall details of the day;80%;with minimal cues (75-90%)  -MB        Time Frame (Memory Skills Goal 1, SLP) short term goal (STG);by discharge  -MB              User Key  (r) = Recorded By, (t) = Taken By, (c) = Cosigned By    Initials Name Effective Dates    Rehana Catherine CCC-SLP 06/16/21 -                    EDUCATION  The patient has been educated in the following areas:     Cognitive Impairment.           SLP GOALS     Row Name 08/08/22 0906             Attention Goal 1 (SLP)    Improve Attention by Goal 1 (SLP) attending to task;complete sustained attention task;80%;with minimal cues (75-90%)  -MB      Time Frame (Attention Goal 1, SLP) short term goal (STG);by discharge  -MB              Orientation Goal 1 (SLP)    Improve Orientation Through Goal 1 (SLP) demonstrating orientation to year;80%;with minimal cues (75-90%)  -MB      Time Frame (Orientation Goal 1, SLP) short term goal (STG);by discharge  -MB              Memory Skills Goal 1 (SLP)    Improve Memory Skills Through Goal 1 (SLP) use external memory aid;use written schedule;use memory strategies;recall details of the day;80%;with minimal cues (75-90%)  -MB      Time Frame (Memory Skills Goal 1, SLP) short term goal (STG);by discharge  -MB            User Key  (r) = Recorded By, (t) = Taken By, (c) = Cosigned By    Initials Name Provider Type    Rehana Catherine CCC-SLP Speech and Language Pathologist                        Time Calculation:      Time Calculation-  SLP     Row Name 08/08/22 0948             Time Calculation- SLP    SLP Start Time 0906  -MB      SLP Stop Time 0948  -MB      SLP Time Calculation (min) 42 min  -MB      SLP Received On 08/08/22  -MB      SLP Goal Re-Cert Due Date 08/18/22  -MB              Untimed Charges    [id number] Eval Speech and Production w/ Language Minutes 42  -MB              Total Minutes    Untimed Charges Total Minutes 42  -MB       Total Minutes 42  -MB            User Key  (r) = Recorded By, (t) = Taken By, (c) = Cosigned By    Initials Name Provider Type    Rehana Catherine CCC-SLP Speech and Language Pathologist                Therapy Charges for Today     Code Description Service Date Service Provider Modifiers Qty    [id number] HC ST EVAL SPEECH AND PROD W LANG  3 8/8/2022 Rehana Lazcano CCC-SLP GN 1                     ABHI Odell  8/8/2022

## 2022-08-08 NOTE — CONSULTS
[facility] Palliative Care Services  Initial Consult    Attending Physician: Chepe Germain MD  Referring Provider: Radha Sal DO     Reason for Referral: Goals of Care/Advance Care Planning    Code Status and Medical Interventions:   Ordered at: 08/07/22 0534     Level Of Support Discussed With:    Next of Kin (If No Surrogate)     Code Status (Patient has no pulse and is not breathing):    CPR (Attempt to Resuscitate)     Medical Interventions (Patient has pulse or is breathing):    Full Support        Subjective     HPI: 81 y.o. male with past medical history of adjustment disorder with withdrawal, cardiomyopathy, carpal tunnel syndrome, cerebral infraction, COVID-19, dementia, elevated PSA, hyperlipidemia, hypertension, major depressive disorder, osteoarthritis, paroxysmal atrial fibrillation on anticoagulation, vitiligo and weakness. Patient presented to [facility] on 8/7/2022 as a transfer from [facility] for GI evaluation. Chart review reveals he presented to outside hospital related to generalized weakness, decreased appetite and bloody stool. Labs from transferring facility revealed positive fecal occult blood, BUN 69, creatinine 1.4, hemoglobin 12.0, hematocrit 39, platelets 248 and lactic 2.79. Of note he previously tested positive for COVID-19 on July 16th however respiratory panel collected on 8/7/2022 was negative. Labs collected at this facility on date of admission revealed troponin 0.080, creatinine 1.69, BUN 96, GFR 40.0, albumin 3.10, lactate 2.5, magnesium 1.5, WBC 17.02, RBC 3.83, hemoglobin 11.9 and hematocrit 35.2.  Urinalysis negative for infection.  Blood cultures pending.  Gastroenterology consulted and evaluated yesterday and recommended EGD once cleared by neuro/cardiology services however does not appear neurology or cardiology have been consulted.  Gastroenterology also recommended continuing PPI.  Medical record sent from transferring facility  reviewed and does not appear he had MRI of brain. Labs collected this morning reveal WBC 15.60, hemoglobin 9.3 and hematocrit 29.7.  No CMP/BMP collected today available for review at this time. He is lying in bed at time of exam, arouses to voice. Does not appear in any distress. Able to correctly state his name and that he is in a hospital however unable to state why or year. Denies pain however reports intermittent nausea. Spouse at bedside.     Advance Care Planning   Advanced Directives: Spouse reports he filled out advance directive in past however never officially completed.      Advance Care Planning Discussion: Care conference held with spouse as Mr. Coleman is unable to demonstrate orientation or ability to make complex medical decisions. Discussed goals of care, medical priorities and advance care planning with his spouse, Flower. Explored events leading to hospitalization, overall decline and long-term prognosis.  Flower reports he has had dementia for several years and she has noticed steady decline however has been able to continue to care for him at home and plans to do so until she is physically unable. Reports they have been  for 55 years and is hopeful she will continue to be able to care for him at home.  Shared she is grateful for all the [facility] and her [facility].  Flower shared recently she has noticed her spouse has started to not talk as much and feels this is from the progression of dementia.  Flower also shared she is able to assist him with getting out of bed to a chair twice daily and he is still able to feed himself however is incontinent of bowel and bladder and requires changing of incontinence briefs. Explored goals of care and whether wishes had been discussed prior and she reports they have discussed and she knows quality of life is most important to her spouse. Reviewed medical priorities including CPR and intubation. Flower shared she knows he would not  wished to be placed on a ventilator. After further discussion expressed wishes to de-escalate CODE STATUS to no CPR no intubation. Explained if he were to undergo a procedure CODE STATUS would be rescinded to full code until procedure was completed and she expressed understanding. Flower appear to have good prognostic awareness.  Support provided and questions answered to her satisfaction.     The patient receives support from his spouse and extended family. His spouse is his next of kin.  Due to the palliative care, goals of care and medical priorities we will establish an advance care plan.      Review of Systems   Unable to perform ROS: dementia     Pain Assessment  PAINAD Breathin-->normal  PAINAD Negative Vocalization: 0-->none  PAINAD Facial Expression: 0-->smiling or inexpressive  PAINAD Body Language: 0-->relaxed  PAINAD Consolability: 0-->no need to console  PAINAD Score: 0  Past Medical History:   Diagnosis Date   • Adjustment disorder with withdrawal    • Cardiomyopathy (HCC)    • Carpal tunnel syndrome    • Cerebral infarction (HCC)    • COVID-19    • Dementia (HCC)    • Elevated PSA    • Elevated PSA    • Hyperlipidemia    • Hypertension    • Major depressive disorder    • Osteoarthritis    • Paroxysmal atrial fibrillation (HCC)    • Vitiligo    • Weakness       History reviewed. No pertinent surgical history.   Social History     Socioeconomic History   • Marital status:    Substance and Sexual Activity   • Alcohol use: Not Currently   • Drug use: Not Currently   • Sexual activity: Not Currently     History reviewed. No pertinent family history.   Allergies   Allergen Reactions   • Ciprofloxacin Unknown - Low Severity   • Lisinopril Unknown - Low Severity   • Sulfa Antibiotics Unknown - Low Severity   • Terazosin Unknown - Low Severity       Objective   Diagnostics: Reviewed    Intake/Output Summary (Last 24 hours) at 2022 0810  Last data filed at 2022 0628  Gross per 24 hour   Intake  935 ml   Output --   Net 935 ml       Current medication reviewed for route, type, dose and frequency and are current per MAR at time of dictation.  Current Facility-Administered Medications   Medication Dose Route Frequency Provider Last Rate Last Admin   • acetaminophen (TYLENOL) suppository 650 mg  650 mg Rectal Q4H PRN Radha Sal DO       • lactated ringers infusion  75 mL/hr Intravenous Continuous Radha Sal DO 75 mL/hr at 08/07/22 2006 75 mL/hr at 08/07/22 2006   • ondansetron (ZOFRAN) injection 4 mg  4 mg Intravenous Q6H PRN Radha Sal DO       • pantoprazole (PROTONIX) injection 80 mg  80 mg Intravenous Q12H Kevin Hernandez MD   80 mg at 08/07/22 2006    Followed by   • [START ON 8/10/2022] pantoprazole (PROTONIX) injection 40 mg  40 mg Intravenous Q12H Kevin Hernandez MD       • sodium chloride 0.9 % flush 10 mL  10 mL Intravenous Q12H Radha Sal DO   10 mL at 08/07/22 2010   • sodium chloride 0.9 % flush 10 mL  10 mL Intravenous PRN Radha Sal DO        lactated ringers, 75 mL/hr, Last Rate: 75 mL/hr (08/07/22 2006)     •  acetaminophen  •  ondansetron  •  sodium chloride  Assessment   /60 (BP Location: Right arm, Patient Position: Lying)   Pulse 102   Temp 97.8 °F (36.6 °C) (Oral)   Resp 18   Wt 117 kg (258 lb 11.2 oz)   SpO2 97%     Physical Exam  Vitals and nursing note reviewed.   Constitutional:       General: He is not in acute distress.     Appearance: He is overweight. He is ill-appearing.   HENT:      Head: Normocephalic and atraumatic.      Mouth/Throat:      Mouth: Mucous membranes are dry.   Eyes:      General: Lids are normal.      Extraocular Movements: Extraocular movements intact.   Neck:      Vascular: No JVD.      Trachea: Trachea normal.   Cardiovascular:      Rate and Rhythm: Tachycardia present. Rhythm irregular.      Heart sounds: Normal heart sounds.   Pulmonary:      Effort: Pulmonary effort is normal.      Breath sounds:  Normal breath sounds.   Chest:      Chest wall: No swelling or tenderness.   Abdominal:      General: Abdomen is protuberant. Bowel sounds are decreased. There is no distension.      Tenderness: There is no abdominal tenderness.   Genitourinary:     Comments: Incontinent of urine.   Musculoskeletal:      Cervical back: Neck supple.   Skin:     General: Skin is warm and dry.      Coloration: Skin is pale.   Neurological:      Mental Status: He is easily aroused. He is disoriented.      Motor: Weakness present.   Psychiatric:         Mood and Affect: Affect is flat.         Behavior: Behavior is cooperative.         Cognition and Memory: Cognition is impaired.     Functional status: Palliative Performance Scale Score: Performance 40% based on the following measures: Ambulation: Mainly in bed, Activity and Evidence of Disease: Unable to do any work, extensive evidence of disease, Self-Care: Mainly assistance required,  Intake: Normal or reduced, LOC: Full, drowsy or confusion.  Nutritional status: Albumin 3.10. There is no height or weight on file to calculate BMI..  Patient status: Disease state: Controlled with current treatments.    Impression/Problem List:  1. Dementia (FAST 7C) / Impaired cognition   2.   Decreased mobility and activities of daily living   3.   GI bleed  4.   Renal insufficiency  5.   Decreased appetite   6.   Anemia  7.   Hypoalbuminemia  8.   Hypomagnesemia  9.   Leukocytosis  10. Advanced age  11. Atrial fibrillation  12. Nausea   13. Elevation in troponin T  14. History of COVID-19  15. History of stroke  16. History of hypertension     Plan / Recommendations     1. Goals of care include CODE STATUS changes to NO CPR with limited support interventions.    2. Palliative care encounter  - Prognosis is guarded long-term secondary to dementia (FAST 7C), decreased mobility and ADLs/poor performance status, GI bleed, renal insufficiency, decreased appetite, anemia, hypoalbuminemia, leukocytosis,  advanced age and other comorbidities listed above.    - Care conference held with spouse, Flower, at bedside as Mr. Coleman is unable to demonstrate orientation or ability to make complex medical decisions.    - Discussed goals of care and medical priorities with Flower who provided history of events leading to hospitalization and overall decline.     - Flower has been caring for him at home with assistance from [facility] and family and is hopeful to continue to be able to do so.     - Explored goals of care and after discussion spouse expressed wishes to de-escalate CODE STATUS to no CPR no intubation. Reports quality of life is most important to Mr. Coleman.     - Spouse appears to have good prognostic awareness.  Support provided and questions answered to her satisfaction.     - Will plan to discuss MOST form and determine if interested in completion before discharge.     - Thank you for allowing us to participate in patient's plan of care. Palliative Care Team will continue to follow patient. Will plan to follow up tomorrow or sooner if needed.    Time spent:90 minutes spent reviewing medical and medication records, assessing and examining patient, discussing with family, answering questions, providing some guidance about a plan and documentation of care, and coordinating care with other healthcare members, with > 50% time spent face to face.   20 minutes spent on advance care planning.      Electronically signed by, LISSETTE Castellanos, 08/08/22, 10:54 CDT.

## 2022-08-08 NOTE — PAYOR COMM NOTE
Savage Gomez (81 y.o. Male) [id number]     Admit 8/7  [facility]   mattie phone    Fax                 Date of Birth   1940    Social Security Number       Address   [address] [place] [address]    Home Phone   [phone]    MRN   [id number]       Restorationist   Roman Catholic    Marital Status                               Admission Date   8/7/22    Admission Type   Urgent    Admitting Provider   Chepe Germain MD    Attending Provider   Chepe Germain MD    Department, Room/Bed   [facility] 3C, 388/1       Discharge Date       Discharge Disposition       Discharge Destination                               Attending Provider: Chepe Germain MD    Allergies: Ciprofloxacin, Lisinopril, Sulfa Antibiotics, Terazosin    Isolation: None   Infection: COVID (History) (08/08/22)   Code Status: CPR   Advance Care Planning Activity    Ht: --   Wt: 117 kg (258 lb 11.2 oz)    Admission Cmt: None   Principal Problem: None                Active Insurance as of 8/7/2022     Primary Coverage     Payor Plan Insurance Group Employer/Plan Group    [facility] [place] DE[id number]      Payor Plan Address Payor Plan Phone Number Payor Plan Fax Number Effective Dates    [facility] OFFICE OF COMMUNITY CARE [phone]  8/6/2022 - None Entered    [address] [address]       [facility] [address]       Subscriber Name Subscriber Birth Date Member ID       SAVAGE GOMEZ 1940 [id number]                 Emergency Contacts      (Rel.) Home Phone Work Phone Mobile Phone    JAUNITO GOMEZ (Spouse) [phone] -- --    ROBBY FLORENTINOILY (Daughter) [phone] -- --               History & Physical      Radha Sal DO at 08/07/22 0534              [facility] Hospital Medicine Services  HISTORY AND PHYSICAL    Date of Admission: 8/7/2022  Primary Care Physician: Provider, No Known    Subjective     Chief Complaint: GI  bleed/Generalized weakness    History of Present Illness  81-year-old male who presents in transfer from [facility].  They did not have GI services.  Patient is noted to be a full code.  He is a retired  with past medical history of atrial fibrillation on Eliquis, CVA, and dementia.  History is taken by transferring EDMD, as the patient is currently nonverbal and not giving history, and his wife is not present.  Patient was noted to be lethargic.  He was not getting up much.  He was not eating and had generalized weakness.  He had to bloody stools in his depends.  He had 1 bloody stool transferring facility.  He had one noted by our nursing staff at our facility.  The patient was subsequently diagnosed with COVID on July 16.  He is still testing positive.  He was occult blood positive at transferring facility.  He was given 500 mL bolus secondary to some soft blood pressures.    Labs from transferring facility:  BUN 69 creatinine 1.4  July 21 hemoglobin was 14.3 and today's labs hemoglobin was 12.0 with hematocrit of 39  Lactic of 2.79  Platelets 248      Review of Systems   Generalized weakness  Rectal bleeding  Ambulates with rolling walker    Otherwise complete ROS reviewed and negative except as mentioned in the HPI.    Past Medical History:   Past Medical History:   Diagnosis Date   • Adjustment disorder with withdrawal    • Cardiomyopathy (HCC)    • Carpal tunnel syndrome    • Cerebral infarction (HCC)    • COVID-19    • Dementia (HCC)    • Elevated PSA    • Elevated PSA    • Hyperlipidemia    • Hypertension    • Major depressive disorder    • Osteoarthritis    • Paroxysmal atrial fibrillation (HCC)    • Vitiligo    • Weakness      Past Surgical History:History reviewed. No pertinent surgical history.  Social History:  reports previous alcohol use. He reports previous drug use.    Family History: Unobtainable from patient.     Allergies:  Allergies   Allergen Reactions   • Ciprofloxacin Unknown -  Low Severity   • Lisinopril Unknown - Low Severity   • Sulfa Antibiotics Unknown - Low Severity   • Terazosin Unknown - Low Severity       Medications:  Prior to Admission medications    Medication Sig Start Date End Date Taking? Authorizing Provider   apixaban (ELIQUIS) 5 MG tablet tablet Take 5 mg by mouth 2 (Two) Times a Day.    Anastacia Traore MD   atorvastatin (LIPITOR) 40 MG tablet Take 40 mg by mouth Every Night.    Anastacia Traore MD   chlorthalidone (HYGROTEN) 50 MG tablet Take 50 mg by mouth Daily.    Anastacia Traore MD   cholecalciferol (VITAMIN D3) 25 MCG (1000 UT) tablet Take 3,000 Units by mouth Daily.    Anastacia Traore MD   donepezil (ARICEPT) 10 MG tablet Take 10 mg by mouth Every Night.    Anastacia Traore MD   escitalopram (LEXAPRO) 20 MG tablet Take 10 mg by mouth Daily.    Anastacia Traore MD   ketoconazole (NIZORAL) 2 % cream Apply 1 application topically to the appropriate area as directed 2 (Two) Times a Day.    Anastacia Traore MD   losartan (COZAAR) 50 MG tablet Take 100 mg by mouth Daily.    Anastacia Traore MD   metoprolol succinate XL (TOPROL-XL) 100 MG 24 hr tablet Take 50 mg by mouth Daily.    Anastacia Traore MD   potassium chloride 10 MEQ CR tablet Take 20 mEq by mouth Daily.    Anastacia Traore MD   venlafaxine (EFFEXOR) 75 MG tablet Take 150 mg by mouth Daily.    Anastacia Traore MD   zinc oxide 20 % ointment Apply 1 application topically to the appropriate area as directed 3 (Three) Times a Day As Needed.    Anastacia Traore MD     I have utilized all available immediate resources to obtain, update, and review the patient's current medications.    Objective     Vital Signs: /80 (BP Location: Right arm, Patient Position: Lying)   Pulse 89   Temp 98.2 °F (36.8 °C) (Oral)   Resp 16   Wt 117 kg (258 lb 11.2 oz)   SpO2 97%   Physical Exam  Vitals reviewed.   Constitutional:       Appearance: He is ill-appearing.       Comments: Smells of GI bleed.    HENT:      Head: Normocephalic and atraumatic.      Right Ear: External ear normal.      Left Ear: External ear normal.      Nose: Nose normal.   Eyes:      General: No scleral icterus.     Conjunctiva/sclera: Conjunctivae normal.   Cardiovascular:      Rate and Rhythm: Normal rate and regular rhythm.      Heart sounds: Normal heart sounds.   Pulmonary:      Effort: Pulmonary effort is normal.      Breath sounds: Normal breath sounds.   Abdominal:      General: There is no distension.      Palpations: Abdomen is soft.   Musculoskeletal:         General: No tenderness.      Cervical back: Normal range of motion and neck supple.   Skin:     General: Skin is warm and dry.      Comments: Sores on bilateral feet.    Neurological:      Mental Status: He is alert. He is disoriented.      Comments: Patient awakens but did not verbally respond or follow directions.    Psychiatric:      Comments: Not tracking and minimally responsive.         Results Reviewed:  Lab Results (last 24 hours)     ** No results found for the last 24 hours. **        Imaging Results (Last 24 Hours)     ** No results found for the last 24 hours. **        I have personally reviewed and interpreted the radiology studies and ECG obtained at time of admission.     Assessment / Plan     Assessment:   Active Hospital Problems    Diagnosis    • GI bleed      Impression:  1. GI bleed  2. A fib on chronic Eliquis  3.  Advanced age with generalized weakness and chronic ambulatory dysfunction  4.  COVID-19  5.  Mild hypotension with history of hypertension    Plan:   1.  Admit to the hospital  2.  NPO, hold Eliquis  3.  Stat labs to include CBC and CMP  4.  Fall/skin/aspiration precautions  5.  PT/OT/speech consults  6.  Palliative care consult  7.   consult, question need for placement  8.  Protonix IV  9.  GI consult  10.  Gentle IV hydration      Code Status/Advanced Care Plan: Full    The patient's  surrogate decision maker is wife.     I discussed my findings and recommendations with the staff.    Estimated length of stay is 2 to 3 days.     The patient was seen and examined by me on 8/7/2022 at seen after midnight.    Electronically signed by Radha Sal DO, 08/07/22, 05:34 CDT.                Electronically signed by Radha Sal DO at 08/07/22 0541       Vital Signs (last 2 days)     Date/Time Temp Temp src Pulse Resp BP Patient Position SpO2    08/08/22 0803 97.8 (36.6) Oral 102 18 104/60 Lying --    08/08/22 0421 97.5 (36.4) Oral 107 18 123/53 Lying 97    08/08/22 0006 97.7 (36.5) Oral 106 16 136/50 Lying 97    08/07/22 2049 96.9 (36.1) Oral 103 16 100/60 Lying 94    08/07/22 1500 97.8 (36.6) Oral 98 16 148/80 Lying 99    08/07/22 1210 98 (36.7) Oral 86 16 140/78 Lying 99    08/07/22 0819 97.5 (36.4) Oral 94 16 134/82 Lying 95    08/07/22 0352 98.2 (36.8) Oral 89 16 130/80 Lying 97    08/07/22 0228 95.3 (35.2) Oral 75 16 118/60 Lying 98          Current Facility-Administered Medications   Medication Dose Route Frequency Provider Last Rate Last Admin   • acetaminophen (TYLENOL) suppository 650 mg  650 mg Rectal Q4H PRN Radha Sal DO       • lactated ringers infusion  75 mL/hr Intravenous Continuous Radha Sal DO 75 mL/hr at 08/08/22 0839 75 mL/hr at 08/08/22 0839   • ondansetron (ZOFRAN) injection 4 mg  4 mg Intravenous Q6H PRN Radha Sal DO       • pantoprazole (PROTONIX) injection 80 mg  80 mg Intravenous Q12H Kevin Hernandez MD   80 mg at 08/08/22 0839    Followed by   • [START ON 8/10/2022] pantoprazole (PROTONIX) injection 40 mg  40 mg Intravenous Q12H Kevin Hernandez MD       • sodium chloride 0.9 % flush 10 mL  10 mL Intravenous Q12H Radha Sal DO   10 mL at 08/08/22 0839   • sodium chloride 0.9 % flush 10 mL  10 mL Intravenous PRN Radha Sal DO            Physician Progress Notes (last 48 hours)      Hema Villa MD at 08/07/22 0818               [facility] Medicine Services  INPATIENT PROGRESS NOTE    Length of Stay: 0  Date of Admission: 8/7/2022  Primary Care Physician: Provider, No Known    Subjective   Chief Complaint: Altered mental status.    HPI   I am not sure what his baseline is.  Hypotension has resolved.  Patient is afebrile.  Patient not requiring oxygen.  Patient is advised about but go right back to sleep.  Patient does not answer any question.  There is no sign of any acute distress.  We will send patient to unit to watch closely due to history of multiple medical problems.    Review of Systems   Unable to obtain due to altered mental status.    All pertinent negatives and positives are as above. All other systems have been reviewed and are negative unless otherwise stated.     Objective    Temp:  [95.3 °F (35.2 °C)-98.2 °F (36.8 °C)] 98 °F (36.7 °C)  Heart Rate:  [75-94] 86  Resp:  [16] 16  BP: (118-140)/(60-82) 140/78  No intake or output data in the 24 hours ending 08/07/22 1425  Physical Exam  Vitals and nursing note reviewed.   Constitutional:       Comments: Obesity.  Advanced age.  Chronically ill.   HENT:      Head: Normocephalic.   Eyes:      Conjunctiva/sclera: Conjunctivae normal.      Pupils: Pupils are equal, round, and reactive to light.   Neck:      Vascular: No JVD.   Cardiovascular:      Rate and Rhythm: Normal rate and regular rhythm.      Heart sounds: Normal heart sounds.   Pulmonary:      Effort: No respiratory distress.      Breath sounds: No wheezing or rales.      Comments: Diminished breath semilateral, clear .  Chest:      Chest wall: No tenderness.   Abdominal:      General: Bowel sounds are normal. There is no distension.      Palpations: Abdomen is soft.      Tenderness: There is no abdominal tenderness.      Comments: Obesity.     Musculoskeletal:         General: No tenderness or deformity.      Cervical back: Neck supple.   Skin:     General: Skin is warm and dry.       Capillary Refill: Capillary refill takes 2 to 3 seconds.      Findings: No rash.   Neurological:      Cranial Nerves: No cranial nerve deficit.      Motor: Weakness present. No abnormal muscle tone.      Coordination: Coordination abnormal.      Gait: Gait abnormal.      Deep Tendon Reflexes: Reflexes normal.         Results Review:  Lab Results (last 24 hours)     Procedure Component Value Units Date/Time    STAT Lactic Acid, Reflex [[id number]]  (Abnormal) Collected: 08/07/22 1322    Specimen: Blood Updated: 08/07/22 1348     Lactate 2.9 mmol/L     STAT Lactic Acid, Reflex [[id number]]  (Abnormal) Collected: 08/07/22 0914    Specimen: Blood Updated: 08/07/22 0942     Lactate 2.4 mmol/L     Troponin [[id number]]  (Abnormal) Collected: 08/07/22 0633    Specimen: Blood Updated: 08/07/22 0707     Troponin T 0.080 ng/mL     Narrative:      Troponin T Reference Range:  <= 0.03 ng/mL-   Negative for AMI  >0.03 ng/mL-     Abnormal for myocardial necrosis.  Clinicians would have to utilize clinical acumen, EKG, Troponin and serial changes to determine if it is an Acute Myocardial Infarction or myocardial injury due to an underlying chronic condition.       Results may be falsely decreased if patient taking Biotin.      Lactic Acid, Plasma [[id number]]  (Abnormal) Collected: 08/07/22 0633    Specimen: Blood Updated: 08/07/22 0706     Lactate 2.5 mmol/L     Comprehensive Metabolic Panel [[id number]]  (Abnormal) Collected: 08/07/22 0633    Specimen: Blood Updated: 08/07/22 0702     Glucose 172 mg/dL      BUN 96 mg/dL      Creatinine 1.69 mg/dL      Sodium 142 mmol/L      Potassium 4.7 mmol/L      Comment: Slight hemolysis detected by analyzer. Results may be affected.        Chloride 109 mmol/L      CO2 20.0 mmol/L      Calcium 9.5 mg/dL      Total Protein 6.2 g/dL      Albumin 3.10 g/dL      ALT (SGPT) 19 U/L      AST (SGOT) 13 U/L      Comment: Slight hemolysis detected by analyzer. Results may be affected.        Alkaline  Phosphatase 98 U/L      Total Bilirubin 0.4 mg/dL      Globulin 3.1 gm/dL      A/G Ratio 1.0 g/dL      BUN/Creatinine Ratio 56.8     Anion Gap 13.0 mmol/L      eGFR 40.0 mL/min/1.73      Comment: National Kidney Foundation and American Society of Nephrology (ASN) Task Force recommended calculation based on the Chronic Kidney Disease Epidemiology Collaboration (CKD-EPI) equation refit without adjustment for race.       Narrative:      GFR Normal >60  Chronic Kidney Disease <60  Kidney Failure <15      Iron Profile [[id number]]  (Abnormal) Collected: 08/07/22 0633    Specimen: Blood Updated: 08/07/22 0701     Iron 114 mcg/dL      Iron Saturation 44 %      Transferrin 174 mg/dL      TIBC 259 mcg/dL     Phosphorus [[id number]]  (Normal) Collected: 08/07/22 0633    Specimen: Blood Updated: 08/07/22 0701     Phosphorus 3.6 mg/dL     Magnesium [[id number]]  (Abnormal) Collected: 08/07/22 0633    Specimen: Blood Updated: 08/07/22 0701     Magnesium 1.5 mg/dL     CBC & Differential [[id number]]  (Abnormal) Collected: 08/07/22 0633    Specimen: Blood Updated: 08/07/22 0641    Narrative:      The following orders were created for panel order CBC & Differential.  Procedure                               Abnormality         Status                     ---------                               -----------         ------                     CBC Auto Differential[[id number]]        Abnormal            Final result                 Please view results for these tests on the individual orders.    CBC Auto Differential [[id number]]  (Abnormal) Collected: 08/07/22 0633    Specimen: Blood Updated: 08/07/22 0641     WBC 17.02 10*3/mm3      RBC 3.83 10*6/mm3      Hemoglobin 11.9 g/dL      Hematocrit 35.2 %      MCV 91.9 fL      MCH 31.1 pg      MCHC 33.8 g/dL      RDW 13.2 %      RDW-SD 43.7 fl      MPV 11.6 fL      Platelets 215 10*3/mm3      Neutrophil % 89.2 %      Lymphocyte % 5.1 %      Monocyte % 4.5 %      Eosinophil % 0.1 %      Basophil %  0.2 %      Immature Grans % 0.9 %      Neutrophils, Absolute 15.20 10*3/mm3      Lymphocytes, Absolute 0.86 10*3/mm3      Monocytes, Absolute 0.76 10*3/mm3      Eosinophils, Absolute 0.01 10*3/mm3      Basophils, Absolute 0.04 10*3/mm3      Immature Grans, Absolute 0.15 10*3/mm3      nRBC 0.0 /100 WBC            Cultures:  No results found for: BLOODCX, URINECX, WOUNDCX, MRSACX, RESPCX, STOOLCX    Radiology Data:    Imaging Results (Last 24 Hours)     Procedure Component Value Units Date/Time    XR Chest 1 View [[id number]] Collected: 08/07/22 1404     Updated: 08/07/22 1408    Narrative:      EXAMINATION: XR CHEST 1 VW- 8/7/2022 2:04 PM CDT     HISTORY: covid positive.     REPORT: A frontal view of the chest was obtained.     COMPARISON: There are no correlative imaging studies for comparison.     Lungs are moderately hypoaerated, no infiltrate is identified. There is  borderline cardiomegaly and no evidence of CHF. No pneumothorax or  pleural effusion is identified. The osseous structures and upper abdomen  appear unremarkable.       Impression:      Pulmonary hypoventilation, no acute infiltrates.  This report was finalized on 08/07/2022 14:05 by Dr. Reuben Ridley MD.          Allergies   Allergen Reactions   • Ciprofloxacin Unknown - Low Severity   • Lisinopril Unknown - Low Severity   • Sulfa Antibiotics Unknown - Low Severity   • Terazosin Unknown - Low Severity       Scheduled meds:   magnesium sulfate, 2 g, Intravenous, Once  pantoprazole, 80 mg, Intravenous, Q12H   Followed by  [START ON 8/10/2022] pantoprazole, 40 mg, Intravenous, Q12H  sodium chloride, 10 mL, Intravenous, Q12H        PRN meds:  •  acetaminophen  •  ondansetron  •  sodium chloride    Assessment/Plan       GI bleed    Renal failure    Hx of atrial fibrillation, no current medication    Hypomagnesemia    Failure to thrive in adult    Lab test positive for detection of COVID-19 virus      Plan:  GI bleed.  Hemoccult positive at transferring  outlying facility.  IV Protonix.  GI consult.  Zofran as needed.    Hypotension.  Resolved.  Slow IV hydration    Renal insufficiency.  Creatinine is 1.69.  Slow IV hydration.    COVID-19-positive.  Patient was admitted on the 16th of July for COVID, patient that there went to rehab, patient went home after. Patient could have no respiratory symptoms.  Chest x-ray-pending.  Patient is on room air.    History of atrial fibrillation/hypertension/hyperlipidemia.  On Eliquis at home.  Hold Eliquis for now.  Hold blood pressure medication for now due to hypotension.  Hold Lipitor due to altered mental status.    Hypomagnesia. 2 g magnesium.  Magnesium in AM.    History of stroke.     History of dementia.    Previous COVID-positive July 16.  Patient still tested positive.  Currently no respiratory symptoms COVID   Patient is currently on room air.    Advanced age.  81 years old.    Nutrition.  N.p.o. for now.  Aspiration precaution.  Speech to evaluate    Deconditioning.  PT and OT consult.  Patient is mostly bedbound last 2 weeks.  Previously patient gets around with a walker.    Urinalysis pending.  Blood cultures pending.  Respiratory PCR.     Discharge Planning: Transfer from the [facility].  Retired .   consult for placement.  Palliative care consult.  Patient is from home.     Electronically signed by Hema Villa MD, 08/07/22, 8:18 AM CDT.                     Electronically signed by Hema Villa MD at 08/07/22 1426          Consult Notes (last 48 hours)      Kevin Hernandez MD at 08/07/22 1406      Consult Orders    1. Inpatient Gastroenterology Consult [[id number]] ordered by Radha Sal DO at 08/07/22 6266             Pt is alert but disoriented and somnolent and unable to give history. Here for GIB. Wife states he had an episode of AMS and fecal incontinence. [facility]. MRI suggests a new CVA and TnI is elevated. Hb ~10. Wife states he had a  colonoscopy but no EGD. Takes ASA and eliquis for afib abd CVA.   On exam somnolent arousable disoriented, low O2 sat on monitor but breathing comfortably. Rales IRR abd benign no mass non tender MJ melena no masses no edema.    Imp: Possible ulcer or esophagitis no evidence of cirrhosis. Will give a PPI 80 BID x 3 days then BID. EGD, once cleared by neuro/cardiology services. Monitor CBC. Short half life anticoagulation, such as unfractionated heparin may be used if indicated by neuro or cards, with CBC monitoring and transfusions to Hb ~9. Hold off anticoagulant if overt bleeding occurs. D/w patient's wife. Dr Pal will assess for EGD tomorrow.   The risk of the endoscopy were discussed with his wife in detail.  We discussed the risk of perforation (one out of 6615-3026, riskier with dilation), bleeding (one out of 500), and the rare risks of infection, adverse reaction to anesthesia, respiratory failure, cardiac failure including MI and adverse reaction to medications, etc.  We discussed consequences that could occur if a risk were to develop such as the need for hospitalization, blood transfusion, surgical intervention, medications, pain and disability and death.  Alternatives include not doing anything, or pursuing an UGI series which only offers a diagnosis with potential less accuracy compared to egd.     Electronically signed by Kevin Hernandez MD at 08/07/22 3898

## 2022-08-08 NOTE — PLAN OF CARE
Goal Outcome Evaluation:  Plan of Care Reviewed With: patient, spouse        Progress: no change  Outcome Evaluation: OT eval completed. Pt alert and oriented to self only. Pt's spouse present and provides history. Pt reports no pain. Only c/o fatigue and nausea. Pt was mod A to roll L but once got EOB refused OOB activity and rolled back in bed. Pt was then dependent for rolling R and scooting. Pt was dependent for simulated LB dressing. Pt is largely non-verbal and remains drowsy throughout but if given additional time and cuing pt will nod/minimally vocalize and increased command following in middle portion of evaluation. Pt demo's 4/5 BUE strength and ROM WFL BUE. Skilled OT recommended to address adls, functional mobility and education. Recommended d/c SNF. Pt's spouse reports plan to d/c home with HH and assist.

## 2022-08-08 NOTE — THERAPY EVALUATION
Patient Name: Bayron Coleman  : 1940    MRN: [id number]                              Today's Date: 2022       Admit Date: 2022    Visit Dx:     ICD-10-CM ICD-9-CM   1. Impaired cognition  R41.89 294.9   2. Impaired mobility  Z74.09 799.89   3. Impaired mobility and ADLs  Z74.09 V49.89    Z78.9      Patient Active Problem List   Diagnosis   • GI bleed   • Renal failure   • Hx of atrial fibrillation, no current medication   • Hypomagnesemia   • Failure to thrive in adult   • Lab test positive for detection of COVID-19 virus     Past Medical History:   Diagnosis Date   • Adjustment disorder with withdrawal    • Cardiomyopathy (HCC)    • Carpal tunnel syndrome    • Cerebral infarction (HCC)    • COVID-19    • Dementia (HCC)    • Elevated PSA    • Elevated PSA    • Hyperlipidemia    • Hypertension    • Major depressive disorder    • Osteoarthritis    • Paroxysmal atrial fibrillation (HCC)    • Vitiligo    • Weakness      History reviewed. No pertinent surgical history.   General Information     Row Name 22 1010          OT Time and Intention    Document Type evaluation  Pt admitted with lethargy, AMS, decreased appetite and generalized weakness. Dx: GI bleed. NPO for pending procedure this date.  -MM     Mode of Treatment occupational therapy  -MM     Row Name 22 1010          General Information    Patient Profile Reviewed yes  -MM     Prior Level of Function max assist:;dependent:;dressing;bathing;mod assist:;all household mobility;transfer;bed mobility;independent:;feeding  -MM     Existing Precautions/Restrictions fall  -MM     Barriers to Rehab medically complex;previous functional deficit  -MM     Row Name 22 1010          Living Environment    People in Home spouse  hospital bed, RW, rollator, w/c, arm rowing machine, tub/shower but does sponge bath  -MM     Row Name 22 1010          Home Main Entrance    Number of Stairs, Main Entrance other (see comments)  ramp  -MM     Row  Name 08/08/22 1010          Stairs Within Home, Primary    Stairs, Within Home, Primary --  does not have to utilize stairs within the house  -MM     Number of Stairs, Within Home, Primary one  -MM     Row Name 08/08/22 1010          Cognition    Orientation Status (Cognition) oriented to;person  -MM     Row Name 08/08/22 1010          Safety Issues, Functional Mobility    Safety Issues Affecting Function (Mobility) ability to follow commands;at risk behavior observed;awareness of need for assistance;friction/shear risk;insight into deficits/self-awareness;judgment;safety precaution awareness;safety precautions follow-through/compliance  -MM     Impairments Affecting Function (Mobility) cognition;endurance/activity tolerance  -MM     Cognitive Impairments, Mobility Safety/Performance attention;awareness, need for assistance;insight into deficits/self-awareness;judgment;safety precaution awareness;safety precaution follow-through  -MM           User Key  (r) = Recorded By, (t) = Taken By, (c) = Cosigned By    Initials Name Provider Type    MM Michael Beasley, OTR/L Occupational Therapist                 Mobility/ADL's     Row Name 08/08/22 1010          Bed Mobility    Bed Mobility rolling left;rolling right;scooting/bridging  -MM     Rolling Left [place] (Bed Mobility) moderate assist (50% patient effort);verbal cues  -MM     Rolling Right [place] (Bed Mobility) dependent (less than 25% patient effort);verbal cues  -MM     Scooting/Bridging [place] (Bed Mobility) dependent (less than 25% patient effort);verbal cues  -MM     Assistive Device (Bed Mobility) bed rails;draw sheet  trendelenberg for scooting  -MM     Comment, (Bed Mobility) further mobility deferred d/t level of assist and pt refusal  -MM     Row Name 08/08/22 1010          Activities of Daily Living    BADL Assessment/Intervention lower body dressing  -MM     Row Name 08/08/22 1010          Lower Body Dressing Assessment/Training     Wagner Level (Lower Body Dressing) socks;dependent (less than 25% patient effort)  -MM     Position (Lower Body Dressing) sitting up in bed  -MM     Comment, (Lower Body Dressing) simulated  -MM           User Key  (r) = Recorded By, (t) = Taken By, (c) = Cosigned By    Initials Name Provider Type    MM Michael Beasley, OTR/L Occupational Therapist               Obj/Interventions     Row Name 08/08/22 1010          Sensory Assessment (Somatosensory)    Sensory Assessment (Somatosensory) UE sensation intact  -MM     Row Name 08/08/22 1010          Range of Motion Comprehensive    General Range of Motion bilateral upper extremity ROM WNL  -MM     Row Name 08/08/22 1010          Strength Comprehensive (MMT)    Comment, General Manual Muscle Testing (MMT) Assessment BUE 4/5  -MM           User Key  (r) = Recorded By, (t) = Taken By, (c) = Cosigned By    Initials Name Provider Type    MM Michael Beasley, OTR/L Occupational Therapist               Goals/Plan     Row Name 08/08/22 1010          Dressing Goal 1 (OT)    Activity/Device (Dressing Goal 1, OT) upper body dressing  -MM     Wagner/Cues Needed (Dressing Goal 1, OT) supervision required;set-up required;verbal cues required  -MM     Time Frame (Dressing Goal 1, OT) long term goal (LTG);by discharge  -MM     Progress/Outcome (Dressing Goal 1, OT) new goal  -MM     [facility] Name 08/08/22 1010          Grooming Goal 1 (OT)    Activity/Device (Grooming Goal 1, OT) grooming skills, all  -MM     Wagner (Grooming Goal 1, OT) supervision required;set-up required;verbal cues required  -MM     Time Frame (Grooming Goal 1, OT) long term goal (LTG);by discharge  -MM     Progress/Outcome (Grooming Goal 1, OT) new goal  -MM     Row Name 08/08/22 1010          Problem Specific Goal 1 (OT)    Problem Specific Goal 1 (OT) Pt will follow 50% of 1-step commands.  -MM     Time Frame (Problem Specific Goal 1, OT) long term goal (LTG);by discharge  -MM      Progress/Outcome (Problem Specific Goal 1, OT) new goal  -MM     Row Name 08/08/22 1010          Therapy Assessment/Plan (OT)    Planned Therapy Interventions (OT) activity tolerance training;adaptive equipment training;BADL retraining;cognitive/visual perception retraining;functional balance retraining;neuromuscular control/coordination retraining;occupation/activity based interventions;passive ROM/stretching;patient/caregiver education/training;ROM/therapeutic exercise;strengthening exercise;transfer/mobility retraining  -MM           User Key  (r) = Recorded By, (t) = Taken By, (c) = Cosigned By    Initials Name Provider Type    MM Michael Beasley, OTR/L Occupational Therapist               Clinical Impression     Row Name 08/08/22 1010          Pain Assessment    Pretreatment Pain Rating 0/10 - no pain  -MM     Posttreatment Pain Rating 0/10 - no pain  -MM     Pre/Posttreatment Pain Comment only reports nausea and nausea  -MM     Row Name 08/08/22 1010          Plan of Care Review    Plan of Care Reviewed With patient;spouse  -MM     Progress no change  -MM     Outcome Evaluation OT eval completed. Pt alert and oriented to self only. Pt's spouse present and provides history. Pt reports no pain. Only c/o fatigue and nausea. Pt was mod A to roll L but once got EOB refused OOB activity and rolled back in bed. Pt was then dependent for rolling R and scooting. Pt was dependent for simulated LB dressing. Pt is largely non-verbal and remains drowsy throughout but if given additional time and cuing pt will nod/minimally vocalize and increased command following in middle portion of evaluation. Pt demo's 4/5 BUE strength and ROM WFL BUE. Skilled OT recommended to address adls, functional mobility and education. Recommended d/c SNF. Pt's spouse reports plan to d/c home with HH and assist.  -MM     Row Name 08/08/22 1010          Therapy Assessment/Plan (OT)    Patient/Family Therapy Goal Statement (OT) pt's spouse  reports she plans to take pt home at d/c  -MM     Rehab Potential (OT) good, to achieve stated therapy goals  -MM     Criteria for Skilled Therapeutic Interventions Met (OT) yes;skilled treatment is necessary  -MM     Therapy Frequency (OT) 3 times/wk  -MM     Predicted Duration of Therapy Intervention (OT) until d/c  -MM     Row Name 08/08/22 1010          Therapy Plan Review/Discharge Plan (OT)    Anticipated Discharge Disposition (OT) skilled nursing facility  -     Row Name 08/08/22 1010          Positioning and Restraints    Pre-Treatment Position in bed  -MM     Post Treatment Position bed  -MM     In Bed notified nsg;fowlers;call light within reach;encouraged to call for assist;exit alarm on;with family/caregiver;side rails up x2  -MM           User Key  (r) = Recorded By, (t) = Taken By, (c) = Cosigned By    Initials Name Provider Type    MM Michael Beasley, OTR/L Occupational Therapist               Outcome Measures     Row Name 08/08/22 1003          How much help from another is currently needed...    Putting on and taking off regular lower body clothing? 1  -MM     Bathing (including washing, rinsing, and drying) 1  -MM     Toileting (which includes using toilet bed pan or urinal) 1  -MM     Putting on and taking off regular upper body clothing 1  -MM     Taking care of personal grooming (such as brushing teeth) 1  -MM     Eating meals 1  -MM     AM-PAC 6 Clicks Score (OT) 6  -MM     Row Name 08/08/22 0737          How much help from another person do you currently need...    Turning from your back to your side while in flat bed without using bedrails? 3  -MS (r) SC (t) MS (c)     Moving from lying on back to sitting on the side of a flat bed without bedrails? 2  -MS (r) SC (t) MS (c)     Moving to and from a bed to a chair (including a wheelchair)? 2  -MS (r) SC (t) MS (c)     Standing up from a chair using your arms (e.g., wheelchair, bedside chair)? 2  -MS (r) SC (t) MS (c)     Climbing 3-5 steps  with a railing? 2  -MS (r) SC (t) MS (c)     To walk in hospital room? 2  -MS (r) SC (t) MS (c)     AM-PAC 6 Clicks Score (PT) 13  -MS (r) SC (t)     Highest level of mobility 4 --> Transferred to chair/commode  -MS (r) SC (t)     Row Name 08/08/22 1003 08/08/22 0737       Functional Assessment    Outcome Measure Options AM-PAC 6 Clicks Daily Activity (OT)  -MM AM-PAC 6 Clicks Basic Mobility (PT)  -MS (r) SC (t) MS (c)          User Key  (r) = Recorded By, (t) = Taken By, (c) = Cosigned By    Initials Name Provider Type    MS Jacinto Melissa DEJA, PT, DPT, NCS Physical Therapist    MM Michael Beasley, OTR/L Occupational Therapist    SC Fe Hayes, PT Student PT Student                Occupational Therapy Education                 Title: PT OT SLP Therapies (In Progress)     Topic: Occupational Therapy (In Progress)     Point: ADL training (Done)     Description:   Instruct learner(s) on proper safety adaptation and remediation techniques during self care or transfers.   Instruct in proper use of assistive devices.              Learning Progress Summary           Patient Acceptance, E, VU by MM at 8/8/2022 1402    Comment: OT role, benefits, POC, d/c planning   Family Acceptance, E, VU by MM at 8/8/2022 1402    Comment: OT role, benefits, POC, d/c planning                   Point: Home exercise program (Not Started)     Description:   Instruct learner(s) on appropriate technique for monitoring, assisting and/or progressing therapeutic exercises/activities.              Learner Progress:  Not documented in this visit.          Point: Precautions (Not Started)     Description:   Instruct learner(s) on prescribed precautions during self-care and functional transfers.              Learner Progress:  Not documented in this visit.          Point: Body mechanics (Not Started)     Description:   Instruct learner(s) on proper positioning and spine alignment during self-care, functional mobility activities and/or  exercises.              Learner Progress:  Not documented in this visit.                      User Key     Initials Effective Dates Name Provider Type Discipline     06/16/21 -  Michael Beasley, OTR/L Occupational Therapist OT              OT Recommendation and Plan  Planned Therapy Interventions (OT): activity tolerance training, adaptive equipment training, BADL retraining, cognitive/visual perception retraining, functional balance retraining, neuromuscular control/coordination retraining, occupation/activity based interventions, passive ROM/stretching, patient/caregiver education/training, ROM/therapeutic exercise, strengthening exercise, transfer/mobility retraining  Therapy Frequency (OT): 3 times/wk  Plan of Care Review  Plan of Care Reviewed With: patient, spouse  Progress: no change  Outcome Evaluation: OT eval completed. Pt alert and oriented to self only. Pt's spouse present and provides history. Pt reports no pain. Only c/o fatigue and nausea. Pt was mod A to roll L but once got EOB refused OOB activity and rolled back in bed. Pt was then dependent for rolling R and scooting. Pt was dependent for simulated LB dressing. Pt is largely non-verbal and remains drowsy throughout but if given additional time and cuing pt will nod/minimally vocalize and increased command following in middle portion of evaluation. Pt demo's 4/5 BUE strength and ROM WFL BUE. Skilled OT recommended to address adls, functional mobility and education. Recommended d/c SNF. Pt's spouse reports plan to d/c home with HH and assist.     Time Calculation:    Time Calculation- OT     Row Name 08/08/22 1003             Time Calculation- OT    OT Start Time 1003  -MM      OT Stop Time 1111  -MM      OT Time Calculation (min) 68 min  -MM      Total Timed Code Minutes- OT 8 minute(s)  -MM      OT Received On 08/08/22  -MM      OT Goal Re-Cert Due Date 08/18/22  -MM              Timed Charges    06627 - OT Therapeutic Activity Minutes 8  -MM               Total Minutes    Timed Charges Total Minutes 8  -MM       Total Minutes 8  -MM            User Key  (r) = Recorded By, (t) = Taken By, (c) = Cosigned By    Initials Name Provider Type    Michael Lilly, OTR/L Occupational Therapist              Therapy Charges for Today     Code Description Service Date Service Provider Modifiers Qty    [id number]  OT THERAPEUTIC ACT EA 15 MIN 8/8/2022 Michael Beasley OTR/L GO 1    [id number]  OT EVAL MOD COMPLEXITY 4 8/8/2022 Michael Beasley OTR/L GO 1               JONATHAN Fontenot/MORIAH  8/8/2022

## 2022-08-08 NOTE — THERAPY EVALUATION
Patient Name: Bayron Coleman  : 1940    MRN: [id number]                              Today's Date: 2022       Admit Date: 2022    Visit Dx:     ICD-10-CM ICD-9-CM   1. Impaired cognition  R41.89 294.9   2. Impaired mobility  Z74.09 799.89     Patient Active Problem List   Diagnosis   • GI bleed   • Renal failure   • Hx of atrial fibrillation, no current medication   • Hypomagnesemia   • Failure to thrive in adult   • Lab test positive for detection of COVID-19 virus     Past Medical History:   Diagnosis Date   • Adjustment disorder with withdrawal    • Cardiomyopathy (HCC)    • Carpal tunnel syndrome    • Cerebral infarction (HCC)    • COVID-19    • Dementia (HCC)    • Elevated PSA    • Elevated PSA    • Hyperlipidemia    • Hypertension    • Major depressive disorder    • Osteoarthritis    • Paroxysmal atrial fibrillation (HCC)    • Vitiligo    • Weakness      History reviewed. No pertinent surgical history.   General Information     Row Name 22 0737          Physical Therapy Time and Intention    Document Type evaluation  Pt admitted for GI bleed; bloody stools; altered mental status. Pt is NPO due to procedure possibly today.  -MS (r) SC (t) MS (c)     Mode of Treatment physical therapy  -MS (r) SC (t) MS (c)     Row Name 22 0737          General Information    Patient Profile Reviewed yes  -MS (r) SC (t) MS (c)     Prior Level of Function mod assist:;max assist:;all household mobility;grooming;dressing;bathing;home management;cooking;cleaning;driving  -MS (r) SC (t) MS (c)     Existing Precautions/Restrictions NPO;fall  -MS (r) SC (t) MS (c)     Barriers to Rehab medically complex;cognitive status  -MS (r) SC (t) MS (c)     Row Name 22 0737          Living Environment    People in Home spouse  -MS (r) SC (t) MS (c)     Row Name 22 0737          Home Main Entrance    Number of Stairs, Main Entrance none  -MS (r) SC (t) MS (c)     Stair Railings, Main Entrance none  -MS (r) SC  (t) MS (c)     Row Name 08/08/22 0737          Stairs Within Home, Primary    Stairs, Within Home, Primary Has ramp for entrance  -MS (r) SC (t) MS (c)     Number of Stairs, Within Home, Primary one  -MS (r) SC (t) MS (c)     Stairs Comment, Within Home, Primary Does not utilize stairs  -MS (r) SC (t) MS (c)     Row Name 08/08/22 0737          Cognition    Orientation Status (Cognition) oriented to;person;place;disoriented to;time;situation  -MS (r) SC (t) MS (c)     Row Name 08/08/22 0737          Safety Issues, Functional Mobility    Safety Issues Affecting Function (Mobility) at risk behavior observed  Pt observed to resist and possible to get agitated due to cognitive status  -MS (r) SC (t) MS (c)     Impairments Affecting Function (Mobility) cognition  -MS (r) SC (t) MS (c)     Cognitive Impairments, Mobility Safety/Performance attention  -MS (r) SC (t) MS (c)           User Key  (r) = Recorded By, (t) = Taken By, (c) = Cosigned By    Initials Name Provider Type    Melissa Durham, PT, DPT, NCS Physical Therapist    SC Fe Hayes, PT Student PT Student               Mobility     Row Name 08/08/22 0737          Bed Mobility    Bed Mobility rolling left;rolling right;scooting/bridging  -MS (r) SC (t) MS (c)     Rolling Left [place] (Bed Mobility) moderate assist (50% patient effort)  -MS (r) SC (t) MS (c)     Rolling Right [place] (Bed Mobility) moderate assist (50% patient effort)  -MS (r) SC (t) MS (c)     Scooting/Bridging [place] (Bed Mobility) moderate assist (50% patient effort);2 person assist  -MS (r) SC (t) MS (c)     Assistive Device (Bed Mobility) bed rails;draw sheet  During scooting higher up in the bed, pt used bed rails to pull while PTs used sheet  -MS (r) SC (t) MS (c)     Comment, (Bed Mobility) Rolling was observed during changing; nurse changed and PT cued at LE and trunk to roll B  -MS (r) SC (t) MS (c)           User Key  (r) = Recorded By, (t) = Taken By, (c) =  Cosigned By    Initials Name Provider Type    Melissa Durham DEJA, PT, DPT, NCS Physical Therapist    Fe Oconnor, PT Student PT Student               Obj/Interventions     Row Name 08/08/22 0737          Range of Motion Comprehensive    General Range of Motion no range of motion deficits identified  -MS (r) SC (t) MS (c)     Comment, General Range of Motion ROM was observed functionally through changes and bed mobility  -MS (r) SC (t) MS (c)     Row Name 08/08/22 0737          Strength Comprehensive (MMT)    General Manual Muscle Testing (MMT) Assessment upper extremity strength deficits identified;lower extremity strength deficits identified  -MS (r) SC (t) MS (c)     Comment, General Manual Muscle Testing (MMT) Assessment BUE & BLE MMT observed functionally in supine 3+/5.  -MS (r) SC (t) MS (c)     Row Name 08/08/22 0737          Sensory Assessment (Somatosensory)    Sensory Assessment (Somatosensory) not tested  -MS (r) SC (t) MS (c)           User Key  (r) = Recorded By, (t) = Taken By, (c) = Cosigned By    Initials Name Provider Type    Melissa Durham DEJA, PT, DPT, NCS Physical Therapist    Fe Oconnor, PT Student PT Student               Goals/Plan     Row Name 08/08/22 0737          Bed Mobility Goal 1 (PT)    Activity/Assistive Device (Bed Mobility Goal 1, PT) bridging;rolling to left;rolling to right;sit to supine;supine to sit  -MS (r) SC (t) MS (c)     [place] Level/Cues Needed (Bed Mobility Goal 1, PT) contact guard required  -MS (r) SC (t) MS (c)     Time Frame (Bed Mobility Goal 1, PT) long term goal (LTG);10 days  -MS (r) SC (t) MS (c)     Progress/Outcomes (Bed Mobility Goal 1, PT) goal ongoing;new goal  -MS (r) SC (t) MS (c)     Row Name 08/08/22 0737          Transfer Goal 1 (PT)    Activity/Assistive Device (Transfer Goal 1, PT) sit-to-stand/stand-to-sit;bed-to-chair/chair-to-bed;walker, rolling  -MS (r) SC (t) MS (c)     [place] Level/Cues Needed (Transfer Goal 1, PT)  minimum assist (75% or more patient effort)  -MS (r) SC (t) MS (c)     Time Frame (Transfer Goal 1, PT) long term goal (LTG);10 days  -MS (r) SC (t) MS (c)     Progress/Outcome (Transfer Goal 1, PT) goal ongoing;new goal  -MS (r) SC (t) MS (c)     Row Name 08/08/22 07          Gait Training Goal 1 (PT)    Activity/Assistive Device (Gait Training Goal 1, PT) gait (walking locomotion);assistive device use;decrease fall risk;increase endurance/gait distance;increase energy conservation;walker, rolling  -MS (r) SC (t) MS (c)     [place] Level (Gait Training Goal 1, PT) minimum assist (75% or more patient effort)  -MS (r) SC (t) MS (c)     Distance (Gait Training Goal 1, PT) 20ft  -MS (r) SC (t) MS (c)     Progress/Outcome (Gait Training Goal 1, PT) goal ongoing;new goal  -MS (r) SC (t) MS (c)     Row Name 08/08/22 0737          Therapy Assessment/Plan (PT)    Planned Therapy Interventions (PT) balance training;bed mobility training;gait training;home exercise program;patient/family education;strengthening;transfer training  -MS (r) SC (t) MS (c)           User Key  (r) = Recorded By, (t) = Taken By, (c) = Cosigned By    Initials Name Provider Type    Melissa Durham, PT, DPT, NCS Physical Therapist    SC Fe Hayes, PT Student PT Student               Clinical Impression     Row Name 08/08/22 0737          Pain    Pretreatment Pain Rating 0/10 - no pain  -MS (r) SC (t) MS (c)     Pre/Posttreatment Pain Comment Pt was disoriented but stated no pain  -MS (r) SC (t) MS (c)     [facility] Name 08/08/22 0737          Plan of Care Review    Plan of Care Reviewed With patient  -MS (r) SC (t) MS (c)     Progress no change  -MS (r) SC (t) MS (c)     Outcome Evaluation Pt eval completed. Pt was supine in bed with nurse taking vitals upon arrivial. Pt was A&Ox2, disoriented to situation and time. Pt stated he was not in any pain. PT observed ROM functionally WNL and strength functionally 3+/5 with rolling L and R at ModA  "during changing, putting socks on, and scooting in bed w pt using bed rails and PTs (x2) using sheet to move pt in bed superiorly. Pt refused to sit on EOB at this time, he stated it was because \"he did not want to\". Pt continued to request water but is NPO. Pt was left in bed sitting in fowlers, with bed rails up, bed alert on, and call light within reach. Today, pt was educated on the benefits of getting out of bed and moving, as well as how to alert the nursing staff. Skilled PT is recommended for strength, endurance, and education. PT recommendations after d/c are IP vs SNF.  -MS (r) SC (t) MS (c)     Row Name 08/08/22 0737          Therapy Assessment/Plan (PT)    Patient/Family Therapy Goals Statement (PT) No goals stated by pt.  -MS (r) SC (t) MS (c)     Rehab Potential (PT) good, to achieve stated therapy goals  -MS (r) SC (t) MS (c)     Criteria for Skilled Interventions Met (PT) yes  -MS (r) SC (t) MS (c)     Therapy Frequency (PT) 2 times/day  -MS (r) SC (t) MS (c)     Predicted Duration of Therapy Intervention (PT) until d/c  -MS (r) SC (t) MS (c)     Row Name 08/08/22 0737          Vital Signs    Pre Patient Position --  -MS (r) SC (t) MS (c)     Intra Patient Position --  -MS (r) SC (t) MS (c)     Post Patient Position --  -MS (r) SC (t) MS (c)     Row Name 08/08/22 0737          Positioning and Restraints    Pre-Treatment Position in bed  -MS (r) SC (t) MS (c)     Post Treatment Position bed  -MS (r) SC (t) MS (c)     In Bed fowlers;call light within reach;encouraged to call for assist;exit alarm on  -MS (r) SC (t) MS (c)           User Key  (r) = Recorded By, (t) = Taken By, (c) = Cosigned By    Initials Name Provider Type    Melissa Durham, PT, DPT, NCS Physical Therapist    Fe Oconnor, PT Student PT Student               Outcome Measures     Row Name 08/08/22 0737          How much help from another person do you currently need...    Turning from your back to your side while in flat " bed without using bedrails? 3  -MS (r) SC (t) MS (c)     Moving from lying on back to sitting on the side of a flat bed without bedrails? 2  -MS (r) SC (t) MS (c)     Moving to and from a bed to a chair (including a wheelchair)? 2  -MS (r) SC (t) MS (c)     Standing up from a chair using your arms (e.g., wheelchair, bedside chair)? 2  -MS (r) SC (t) MS (c)     Climbing 3-5 steps with a railing? 2  -MS (r) SC (t) MS (c)     To walk in hospital room? 2  -MS (r) SC (t) MS (c)     AM-PAC 6 Clicks Score (PT) 13  -MS (r) SC (t)     Highest level of mobility 4 --> Transferred to chair/commode  -MS (r) SC (t)     Row Name 08/08/22 0737          Functional Assessment    Outcome Measure Options AM-PAC 6 Clicks Basic Mobility (PT)  -MS (r) SC (t) MS (c)           User Key  (r) = Recorded By, (t) = Taken By, (c) = Cosigned By    Initials Name Provider Type    Melissa Durham, PT, DPT, NCS Physical Therapist    SC Fe Hayes, PT Student PT Student                             Physical Therapy Education                 Title: PT OT SLP Therapies (In Progress)     Topic: Physical Therapy (In Progress)     Point: Mobility training (Done)     Learning Progress Summary           Patient Acceptance, E, NR,VU by SC at 8/8/2022 0737    Comment: Pt was educated on the benefits of getting up out of bed and moving.                   Point: Home exercise program (Not Started)     Learner Progress:  Not documented in this visit.          Point: Body mechanics (Not Started)     Learner Progress:  Not documented in this visit.          Point: Precautions (Not Started)     Learner Progress:  Not documented in this visit.                      User Key     Initials Effective Dates Name Provider Type Discipline    SC 07/18/22 -  Fe Hayes, PT Student PT Student PT              PT Recommendation and Plan  Planned Therapy Interventions (PT): balance training, bed mobility training, gait training, home exercise program, patient/family  "education, strengthening, transfer training  Plan of Care Reviewed With: patient  Progress: no change  Outcome Evaluation: Pt eval completed. Pt was supine in bed with nurse taking vitals upon arrivial. Pt was A&Ox2, disoriented to situation and time. Pt stated he was not in any pain. PT observed ROM functionally WNL and strength functionally 3+/5 with rolling L and R at ModA during changing, putting socks on, and scooting in bed w pt using bed rails and PTs (x2) using sheet to move pt in bed superiorly. Pt refused to sit on EOB at this time, he stated it was because \"he did not want to\". Pt continued to request water but is NPO. Pt was left in bed sitting in fowlers, with bed rails up, bed alert on, and call light within reach. Today, pt was educated on the benefits of getting out of bed and moving, as well as how to alert the nursing staff. Skilled PT is recommended for strength, endurance, and education. PT recommendations after d/c are IP vs SNF.     Time Calculation:    PT Charges     Row Name 08/08/22 0737             Time Calculation    Start Time 0737  -MS (r) SC (t) MS (c)      Stop Time 0825  -MS (r) SC (t) MS (c)      Time Calculation (min) 48 min  -MS (r) SC (t)      PT Received On 08/08/22  -MS (r) SC (t) MS (c)      PT Goal Re-Cert Due Date 08/18/22  -MS (r) SC (t) MS (c)              Untimed Charges    PT Eval/Re-eval Minutes 48  -MS (r) SC (t) MS (c)              Total Minutes    Untimed Charges Total Minutes 48  -MS (r) SC (t)       Total Minutes 48  -MS (r) SC (t)            User Key  (r) = Recorded By, (t) = Taken By, (c) = Cosigned By    Initials Name Provider Type    MS Jacinto Melissa DEJA, PT, DPT, NCS Physical Therapist    Fe Oconnor, PT Student PT Student                  PT G-Codes  Outcome Measure Options: AM-PAC 6 Clicks Basic Mobility (PT)  AM-PAC 6 Clicks Score (PT): 13    Fe Hayes PT Student  8/8/2022    "

## 2022-08-08 NOTE — PLAN OF CARE
"Goal Outcome Evaluation:  Plan of Care Reviewed With: patient        Progress: no change  Outcome Evaluation: Pt eval completed. Pt was supine in bed with nurse taking vitals upon arrivial. Pt was A&Ox2, disoriented to situation and time. Pt stated he was not in any pain. PT observed ROM functionally WNL and strength functionally 3+/5 with rolling L and R at ModA during changing, putting socks on, and scooting in bed w pt using bed rails and PTs (x2) using sheet to move pt in bed superiorly. Pt refused to sit on EOB at this time, he stated it was because \"he did not want to\". Pt continued to request water but is NPO. Pt was left in bed sitting in fowlers, with bed rails up, bed alert on, and call light within reach. Today, pt was educated on the benefits of getting out of bed and moving, as well as how to alert the nursing staff. Skilled PT is recommended for strength, endurance, and education. PT recommendations after d/c are IP vs SNF.  "

## 2022-08-08 NOTE — PROGRESS NOTES
[facility] Medicine Services  INPATIENT PROGRESS NOTE    Patient Name: Bayron Coleman  Date of Admission: 8/7/2022  Today's Date: 08/08/22  Length of Stay: 1  Primary Care Physician: Provider, No Known    Subjective   Chief Complaint: follow up GI Bleed  HPI   Doing ok.  No abdominal pain.  No further black or bloody stools.        Review of Systems   Constitutional: Positive for fatigue. Negative for fever.   HENT: Negative for congestion and ear pain.    Eyes: Negative for redness and visual disturbance.   Respiratory: Negative for cough, shortness of breath and wheezing.    Cardiovascular: Negative for chest pain and palpitations.   Gastrointestinal: Positive for blood in stool. Negative for abdominal pain, diarrhea, nausea and vomiting.   Endocrine: Negative for cold intolerance and heat intolerance.   Genitourinary: Negative for dysuria and frequency.   Musculoskeletal: Negative for arthralgias and back pain.   Skin: Negative for rash and wound.   Neurological: Positive for weakness. Negative for dizziness and headaches.   Psychiatric/Behavioral: Negative for confusion. The patient is not nervous/anxious.           All pertinent negatives and positives are as above. All other systems have been reviewed and are negative unless otherwise stated.     Objective    Temp:  [96.9 °F (36.1 °C)-97.8 °F (36.6 °C)] 97.7 °F (36.5 °C)  Heart Rate:  [] 105  Resp:  [16-18] 18  BP: (100-148)/(50-80) 105/64  Physical Exam  Vitals reviewed.   Constitutional:       Appearance: He is well-developed.   HENT:      Head: Normocephalic and atraumatic.      Right Ear: External ear normal.      Left Ear: External ear normal.      Nose: Nose normal.   Eyes:      General: No scleral icterus.        Right eye: No discharge.         Left eye: No discharge.      Conjunctiva/sclera: Conjunctivae normal.      Pupils: Pupils are equal, round, and reactive to light.   Neck:      Thyroid: No thyromegaly.       Trachea: No tracheal deviation.   Cardiovascular:      Rate and Rhythm: Normal rate and regular rhythm.      Heart sounds: Normal heart sounds. No murmur heard.    No friction rub. No gallop.   Pulmonary:      Effort: Pulmonary effort is normal. No respiratory distress.      Breath sounds: Normal breath sounds. No stridor. No wheezing or rales.   Chest:      Chest wall: No tenderness.   Abdominal:      General: Bowel sounds are normal. There is no distension.      Palpations: Abdomen is soft. There is no mass.      Tenderness: There is no abdominal tenderness. There is no guarding or rebound.      Hernia: No hernia is present.   Musculoskeletal:         General: No deformity. Normal range of motion.      Cervical back: Normal range of motion and neck supple.   Lymphadenopathy:      Cervical: No cervical adenopathy.   Skin:     General: Skin is warm and dry.      Coloration: Skin is not pale.      Findings: No erythema or rash.   Neurological:      Mental Status: He is alert and oriented to person, place, and time.      Cranial Nerves: No cranial nerve deficit.      Motor: No abnormal muscle tone.      Coordination: Coordination normal.      Deep Tendon Reflexes: Reflexes are normal and symmetric. Reflexes normal.   Psychiatric:         Behavior: Behavior normal.         Thought Content: Thought content normal.         Judgment: Judgment normal.             Results Review:  I have reviewed the labs, radiology results, and diagnostic studies.    Laboratory Data:   Results from last 7 days   Lab Units 08/08/22  0748 08/07/22  0633   WBC 10*3/mm3 15.60* 17.02*   HEMOGLOBIN g/dL 9.3* 11.9*   HEMATOCRIT % 29.7* 35.2*   PLATELETS 10*3/mm3 216 215        Results from last 7 days   Lab Units 08/07/22  0633   SODIUM mmol/L 142   POTASSIUM mmol/L 4.7   CHLORIDE mmol/L 109*   CO2 mmol/L 20.0*   BUN mg/dL 96*   CREATININE mg/dL 1.69*   CALCIUM mg/dL 9.5   BILIRUBIN mg/dL 0.4   ALK PHOS U/L 98   ALT (SGPT) U/L 19   AST (SGOT)  U/L 13   GLUCOSE mg/dL 172*       Culture Data:   No results found for: BLOODCX, URINECX, WOUNDCX, MRSACX, RESPCX, STOOLCX    Radiology Data:   Imaging Results (Last 24 Hours)     Procedure Component Value Units Date/Time    XR Chest 1 View [[id number]] Collected: 08/07/22 1404     Updated: 08/07/22 1408    Narrative:      EXAMINATION: XR CHEST 1 VW- 8/7/2022 2:04 PM CDT     HISTORY: covid positive.     REPORT: A frontal view of the chest was obtained.     COMPARISON: There are no correlative imaging studies for comparison.     Lungs are moderately hypoaerated, no infiltrate is identified. There is  borderline cardiomegaly and no evidence of CHF. No pneumothorax or  pleural effusion is identified. The osseous structures and upper abdomen  appear unremarkable.       Impression:      Pulmonary hypoventilation, no acute infiltrates.  This report was finalized on 08/07/2022 14:05 by Dr. Reuben Ridley MD.          I have reviewed the patient's current medications.     Assessment/Plan     Active Hospital Problems    Diagnosis    • GI bleed    • Renal failure    • Hx of atrial fibrillation, no current medication    • Hypomagnesemia    • Failure to thrive in adult    • Lab test positive for detection of COVID-19 virus          1.  GI Bleed  -Protonix  -GI following, plans for EGD in AM  -Eliquis held    2.  SHARRON  -IVF    3.  A-fib  -telemetry    4.  Hypomagnesemia  -Mg replaced    5.  Iron deficiency anemia secondary to #1  -Replace Iron        Discharge Planning: I expect the patient to be discharged to home in 2-3 days    Electronically signed by Chepe Germain MD, 08/08/22, 13:45 CDT.

## 2022-08-08 NOTE — PLAN OF CARE
Goal Outcome Evaluation:    Pt is alert and can tell me his name and . Pt told me he was in the hospital. Pt did not know the date and year. Pt told me he retired out of the [employer] after 20 years and that he was a  when he was in the service. Pt assisted with turns and with rolling his to change his brief. Pt has had one wet brief. Pts VS within normal limits and pt resting. Bed alarm set. Call bell within reach. Will continue to monitor

## 2022-08-08 NOTE — PLAN OF CARE
Goal Outcome Evaluation:  Plan of Care Reviewed With: patient           Outcome Evaluation: pt disoriented x4; IVF; incont; COVID isolation; VSS; safety maintained

## 2022-08-08 NOTE — PLAN OF CARE
Goal Outcome Evaluation:  Plan of Care Reviewed With: patient           Outcome Evaluation: Pt. resting in bed. Disoriented to place, situation, and time. IVF infusing. Clear liquid diet and NPO at midnight. SCD's on pt. Tele on. Bed alarm on. Safety maintained.

## 2022-08-09 ENCOUNTER — ANESTHESIA EVENT (OUTPATIENT)
Dept: GASTROENTEROLOGY | Facility: HOSPITAL | Age: 82
End: 2022-08-09

## 2022-08-09 ENCOUNTER — ANESTHESIA (OUTPATIENT)
Dept: GASTROENTEROLOGY | Facility: HOSPITAL | Age: 82
End: 2022-08-09

## 2022-08-09 PROBLEM — K25.9 GASTRIC ULCER: Status: ACTIVE | Noted: 2022-08-09

## 2022-08-09 LAB
ANION GAP SERPL CALCULATED.3IONS-SCNC: 9 MMOL/L (ref 5–15)
BUN SERPL-MCNC: 74 MG/DL (ref 8–23)
BUN/CREAT SERPL: 50.3 (ref 7–25)
CALCIUM SPEC-SCNC: 9.1 MG/DL (ref 8.6–10.5)
CHLORIDE SERPL-SCNC: 112 MMOL/L (ref 98–107)
CO2 SERPL-SCNC: 26 MMOL/L (ref 22–29)
CREAT SERPL-MCNC: 1.47 MG/DL (ref 0.76–1.27)
DEPRECATED RDW RBC AUTO: 48.3 FL (ref 37–54)
EGFRCR SERPLBLD CKD-EPI 2021: 47.3 ML/MIN/1.73
ERYTHROCYTE [DISTWIDTH] IN BLOOD BY AUTOMATED COUNT: 14.1 % (ref 12.3–15.4)
GLUCOSE SERPL-MCNC: 123 MG/DL (ref 65–99)
HCT VFR BLD AUTO: 26.3 % (ref 37.5–51)
HGB BLD-MCNC: 8.4 G/DL (ref 13–17.7)
MCH RBC QN AUTO: 31 PG (ref 26.6–33)
MCHC RBC AUTO-ENTMCNC: 31.9 G/DL (ref 31.5–35.7)
MCV RBC AUTO: 97 FL (ref 79–97)
PLATELET # BLD AUTO: 206 10*3/MM3 (ref 140–450)
PMV BLD AUTO: 10.8 FL (ref 6–12)
POTASSIUM SERPL-SCNC: 3.7 MMOL/L (ref 3.5–5.2)
RBC # BLD AUTO: 2.71 10*6/MM3 (ref 4.14–5.8)
SODIUM SERPL-SCNC: 147 MMOL/L (ref 136–145)
WBC NRBC COR # BLD: 8.49 10*3/MM3 (ref 3.4–10.8)

## 2022-08-09 PROCEDURE — 80048 BASIC METABOLIC PNL TOTAL CA: CPT | Performed by: FAMILY MEDICINE

## 2022-08-09 PROCEDURE — 0DB78ZX EXCISION OF STOMACH, PYLORUS, VIA NATURAL OR ARTIFICIAL OPENING ENDOSCOPIC, DIAGNOSTIC: ICD-10-PCS | Performed by: INTERNAL MEDICINE

## 2022-08-09 PROCEDURE — 43239 EGD BIOPSY SINGLE/MULTIPLE: CPT | Performed by: INTERNAL MEDICINE

## 2022-08-09 PROCEDURE — 88305 TISSUE EXAM BY PATHOLOGIST: CPT | Performed by: INTERNAL MEDICINE

## 2022-08-09 PROCEDURE — 97530 THERAPEUTIC ACTIVITIES: CPT | Performed by: OCCUPATIONAL THERAPIST

## 2022-08-09 PROCEDURE — 97535 SELF CARE MNGMENT TRAINING: CPT | Performed by: OCCUPATIONAL THERAPIST

## 2022-08-09 PROCEDURE — 97530 THERAPEUTIC ACTIVITIES: CPT

## 2022-08-09 PROCEDURE — 99232 SBSQ HOSP IP/OBS MODERATE 35: CPT

## 2022-08-09 PROCEDURE — 25010000002 PROPOFOL 10 MG/ML EMULSION: Performed by: NURSE ANESTHETIST, CERTIFIED REGISTERED

## 2022-08-09 PROCEDURE — 85027 COMPLETE CBC AUTOMATED: CPT | Performed by: FAMILY MEDICINE

## 2022-08-09 RX ORDER — ASPIRIN 81 MG/1
81 TABLET ORAL DAILY
COMMUNITY

## 2022-08-09 RX ORDER — SODIUM CHLORIDE 0.9 % (FLUSH) 0.9 %
10 SYRINGE (ML) INJECTION AS NEEDED
Status: DISCONTINUED | OUTPATIENT
Start: 2022-08-09 | End: 2022-08-09 | Stop reason: HOSPADM

## 2022-08-09 RX ORDER — SODIUM CHLORIDE 9 MG/ML
100 INJECTION, SOLUTION INTRAVENOUS CONTINUOUS
Status: DISCONTINUED | OUTPATIENT
Start: 2022-08-09 | End: 2022-08-10

## 2022-08-09 RX ORDER — LIDOCAINE HYDROCHLORIDE 20 MG/ML
INJECTION, SOLUTION EPIDURAL; INFILTRATION; INTRACAUDAL; PERINEURAL AS NEEDED
Status: DISCONTINUED | OUTPATIENT
Start: 2022-08-09 | End: 2022-08-09 | Stop reason: SURG

## 2022-08-09 RX ORDER — VENLAFAXINE HYDROCHLORIDE 150 MG/1
150 CAPSULE, EXTENDED RELEASE ORAL DAILY
COMMUNITY

## 2022-08-09 RX ORDER — PROPOFOL 10 MG/ML
VIAL (ML) INTRAVENOUS AS NEEDED
Status: DISCONTINUED | OUTPATIENT
Start: 2022-08-09 | End: 2022-08-09 | Stop reason: SURG

## 2022-08-09 RX ORDER — BUPIVACAINE HCL/0.9 % NACL/PF 0.125 %
PLASTIC BAG, INJECTION (ML) EPIDURAL AS NEEDED
Status: DISCONTINUED | OUTPATIENT
Start: 2022-08-09 | End: 2022-08-09 | Stop reason: SURG

## 2022-08-09 RX ORDER — SODIUM CHLORIDE 0.9 % (FLUSH) 0.9 %
10 SYRINGE (ML) INJECTION EVERY 12 HOURS SCHEDULED
Status: DISCONTINUED | OUTPATIENT
Start: 2022-08-09 | End: 2022-08-09 | Stop reason: HOSPADM

## 2022-08-09 RX ADMIN — ESCITALOPRAM 10 MG: 10 TABLET, FILM COATED ORAL at 15:52

## 2022-08-09 RX ADMIN — PROPOFOL 80 MG: 10 INJECTION, EMULSION INTRAVENOUS at 13:50

## 2022-08-09 RX ADMIN — SODIUM CHLORIDE 100 ML/HR: 9 INJECTION, SOLUTION INTRAVENOUS at 13:04

## 2022-08-09 RX ADMIN — PANTOPRAZOLE SODIUM 80 MG: 40 INJECTION, POWDER, FOR SOLUTION INTRAVENOUS at 09:46

## 2022-08-09 RX ADMIN — DONEPEZIL HYDROCHLORIDE 10 MG: 10 TABLET, FILM COATED ORAL at 20:44

## 2022-08-09 RX ADMIN — PANTOPRAZOLE SODIUM 80 MG: 40 INJECTION, POWDER, FOR SOLUTION INTRAVENOUS at 23:08

## 2022-08-09 RX ADMIN — LIDOCAINE HYDROCHLORIDE 100 MG: 20 INJECTION, SOLUTION EPIDURAL; INFILTRATION; INTRACAUDAL; PERINEURAL at 13:50

## 2022-08-09 RX ADMIN — Medication 10 ML: at 20:49

## 2022-08-09 RX ADMIN — ATORVASTATIN CALCIUM 40 MG: 40 TABLET, FILM COATED ORAL at 20:44

## 2022-08-09 RX ADMIN — SODIUM CHLORIDE, POTASSIUM CHLORIDE, SODIUM LACTATE AND CALCIUM CHLORIDE 75 ML/HR: 600; 310; 30; 20 INJECTION, SOLUTION INTRAVENOUS at 10:19

## 2022-08-09 RX ADMIN — PROPOFOL 100 MG: 10 INJECTION, EMULSION INTRAVENOUS at 13:55

## 2022-08-09 RX ADMIN — Medication 200 MCG: at 13:57

## 2022-08-09 RX ADMIN — Medication 10 ML: at 09:47

## 2022-08-09 NOTE — PROGRESS NOTES
[facility] Palliative Care Services  Progress Note  Patient Name: Bayron Coleman  Date of Admission: 8/7/2022  Today's Date: 08/09/22     Code Status and Medical Interventions:   Ordered at: 08/08/22 1041     Medical Intervention Limits:    NO intubation (DNI)     Level Of Support Discussed With:    Next of Kin (If No Surrogate)     Code Status (Patient has no pulse and is not breathing):    No CPR (Do Not Attempt to Resuscitate)     Medical Interventions (Patient has pulse or is breathing):    Limited Support     Comments:    Spouse - Flower     Subjective   Chief complaint/Reason for Referral/Visit: Follow up on Goals of Care/Advance Care Planning.    Medical record reviewed. Events noted. Labs collected this morning reveal improvement in renal function with creatinine 1.47 (1.91 yesterday afternoon), BUN 74 (95 yesterday afternoon) and GFR 47.3 (34.6 yesterday afternoon). Hemoglobin and hematocrit continues to trend downward with hemoglobin now 8.4 and hematocrit 26.3 however he has been receiving IV fluids. Underwent EGD today with GI, appeared to have two non-bleeding ulcers that were biopsied and sent for pathology however exam otherwise normal procedure note. GI recommended consideration of colonoscopy to rule out lower GI bleeding if he continues to have recurrent bleeding. Lying in bed at time of exam, much more alert today. Able to correctly state name and place however unable to recall year or why he is in hospital. Denies any nausea or abdominal pain. Spouse present at bedside.     Advance Care Planning   Advanced Directives: Spouse reports he filled out advance directive in past however never officially completed.      Advance Care Planning Discussion: Discussed treatment and discharge with spouse, Flower, as   as Mr. Coleman is unable to demonstrate ability to make complex medical decisions at this time. Flower had questions regarding results from procedure, explained samples were sent for  pathology however otherwise appeared normal per reports and questioned what had caused episode of bleed. Hopeful he continues to improve so he can return home back to his normal routine. Encouraged Mr. Coleman to continue working with therapy so he may return home if safe. Appreciative of visit.    The patient receives support from his spouse and extended family. His spouse is his next of kin.    Due to the palliative care, treatment options and discharge options we will establish an advance care plan.    Goals of care: Ongoing.    Review of Systems   Unable to perform ROS: dementia     Pain Assessment  CPOT and PAINAD Scales: PAINAD (Pain Assessment in Advance Dementia Scale)  CPOT Facial Expression: 0-->relaxed, neutral  CPOT Body Movements: 0-->absence of movements  CPOT Muscle Tension: 0-->relaxed  Ventilator Compliance/Vocalization: 0-->tolerating ventilator or movement  CPOT Score: 0  PAINAD Breathin-->normal  PAINAD Negative Vocalization: 0-->none  PAINAD Facial Expression: 0-->smiling or inexpressive  PAINAD Body Language: 0-->relaxed  PAINAD Consolability: 0-->no need to console  PAINAD Score: 0  Objective   Diagnostics: Reviewed      Intake/Output Summary (Last 24 hours) at 2022 1434  Last data filed at 20226  Gross per 24 hour   Intake 2828 ml   Output --   Net 2828 ml     Current Facility-Administered Medications   Medication Dose Route Frequency Provider Last Rate Last Admin   • acetaminophen (TYLENOL) suppository 650 mg  650 mg Rectal Q4H PRN Radha Sal DO       • atorvastatin (LIPITOR) tablet 40 mg  40 mg Oral Nightly Chepe Germain MD   40 mg at 22   • donepezil (ARICEPT) tablet 10 mg  10 mg Oral Nightly Chepe Germain MD   10 mg at 22   • escitalopram (LEXAPRO) tablet 10 mg  10 mg Oral Daily Chepe Germain MD   10 mg at 22 1620   • lactated ringers infusion  75 mL/hr Intravenous Continuous Radha Sal DO 75 mL/hr at  08/09/22 1019 75 mL/hr at 08/09/22 1019   • ondansetron (ZOFRAN) injection 4 mg  4 mg Intravenous Q6H PRN Radha Sal DO       • pantoprazole (PROTONIX) injection 80 mg  80 mg Intravenous Q12H Kevin Hernandez MD   80 mg at 08/09/22 0946    Followed by   • [START ON 8/10/2022] pantoprazole (PROTONIX) injection 40 mg  40 mg Intravenous Q12H Kevin Hernandez MD       • sodium chloride 0.9 % flush 10 mL  10 mL Intravenous Q12H Radha Sal DO   10 mL at 08/09/22 0947   • sodium chloride 0.9 % flush 10 mL  10 mL Intravenous PRN Radha Sal DO       • sodium chloride 0.9 % infusion  100 mL/hr Intravenous Continuous Aisha Tomas  mL/hr at 08/09/22 1342 Currently Infusing at 08/09/22 1342     lactated ringers, 75 mL/hr, Last Rate: 75 mL/hr (08/09/22 1019)  sodium chloride, 100 mL/hr, Last Rate: 100 mL/hr (08/09/22 1342)      •  acetaminophen  •  ondansetron  •  sodium chloride    Assessment:  Vital Signs: /68   Pulse 96   Temp 97.8 °F (36.6 °C) (Axillary)   Resp 20   Wt 117 kg (258 lb 11.2 oz)   SpO2 100%     Physical Exam  Vitals and nursing note reviewed.   Constitutional:       General: He is not in acute distress.     Appearance: He is obese.   HENT:      Head: Normocephalic and atraumatic.      Mouth/Throat:      Mouth: Mucous membranes are dry.   Eyes:      General: Lids are normal.      Extraocular Movements: Extraocular movements intact.   Neck:      Vascular: No JVD.      Trachea: Trachea normal.   Cardiovascular:      Rate and Rhythm: Tachycardia present. Rhythm irregular.      Heart sounds: Normal heart sounds.   Pulmonary:      Effort: Pulmonary effort is normal.      Breath sounds: No wheezing, rhonchi or rales.   Chest:      Chest wall: No swelling or tenderness.   Abdominal:      General: Abdomen is protuberant. Bowel sounds are decreased. There is no distension.   Genitourinary:     Comments: Incontinent of urine per spouse.  Musculoskeletal:       Cervical back: Neck supple.   Skin:     General: Skin is warm and dry.   Neurological:      Mental Status: He is alert. He is disoriented.      Motor: Weakness present.   Psychiatric:         Mood and Affect: Affect is flat.         Behavior: Behavior is cooperative.         Cognition and Memory: Cognition is impaired.       Functional status: Palliative Performance Scale Score: Performance 40% based on the following measures: Ambulation: Mainly in bed, Activity and Evidence of Disease: Unable to do any work, extensive evidence of disease, Self-Care: Mainly assistance required,  Intake: Normal or reduced, LOC: Full, drowsy or confusion.  Nutritional status: Albumin 3.10. There is no height or weight on file to calculate BMI.  Patient status: Disease state: Controlled with current treatments.    Impression/Problem List:  1. Dementia (FAST 7C) / Impaired cognition   2.   Impaired mobility and activities of daily living   3.   Fecal occult blood positive   4.   Renal insufficiency   5.   Anemia - Iron deficiency   6.   Gastric ulcer - Per EGD  7.   Hypoalbuminemia  8.   Atrial fibrillation  9.   Advanced age  10. History of COVID-19    Plans/Recommendations     1. Goals of care include CODE STATUS NO CPR with limited support interventions.    2. Palliative care encounter  - Prognosis is poor to guarded long term secondary to dementia (FAST 7C), impaired mobility and ADLs, fecal occult blood positive, gastric ulcers, renal insufficiency, iron deficiency anemia, hypoalbuminemia, atrial fibrillation, advanced age and other comorbidities listed above.    - Discussed treatment and discharge with spouse, Flower, as as Mr. Coleman is unable to demonstrate ability to make complex medical decisions.    - Spouse had questions regarding results from procedure and cause of fecal occult blood test being positive. Explained samples were collected and sent to pathology.     - Patient and spouse are hopeful for improvement so he can  safely return home at discharge.     - Would benefit from MOST form, will plan to discuss before discharge to determine if spouse is interested in completing.       - Thank you for allowing us to participate in patient's plan of care. Palliative Care Team will continue to follow patient.     Time spent:25 minutes spent reviewing medical and medication records, assessing and examining patient, discussing with family, answering questions, providing some guidance about a plan and documentation of care, and coordinating care with other healthcare members, with > 50% time spent face to face.       Electronically signed by, LISSETTE Castellanos, 08/09/22, 14:34 CDT.

## 2022-08-09 NOTE — ANESTHESIA PREPROCEDURE EVALUATION
Anesthesia Evaluation     Patient summary reviewed   NPO Solid Status: > 8 hours  NPO Liquid Status: > 8 hours           Airway   Comment: concompliant with exam  Dental    (+) edentulous    Pulmonary    (+) recent URI (COVID 07/16/22),   Cardiovascular   Exercise tolerance: good (4-7 METS)    PT is on anticoagulation therapy    (+) hypertension, dysrhythmias Atrial Fib, hyperlipidemia,   (-) past MI, CAD, angina      Neuro/Psych  (+) psychiatric history Depression, dementia,    GI/Hepatic/Renal/Endo    (+)  GI bleeding , renal disease CRI,     Musculoskeletal     Abdominal    Substance History      OB/GYN          Other   blood dyscrasia anemia,                     Anesthesia Plan    ASA 3     MAC     (Discussed No CPR and wife agrees to rescind during procedure. Wife voices understanding )  intravenous induction     Anesthetic plan, risks, benefits, and alternatives have been provided, discussed and informed consent has been obtained with: patient and spouse/significant other.        CODE STATUS:    Medical Intervention Limits: NO intubation (DNI)  Level Of Support Discussed With: Next of Kin (If No Surrogate)  Code Status (Patient has no pulse and is not breathing): No CPR (Do Not Attempt to Resuscitate)  Medical Interventions (Patient has pulse or is breathing): Limited Support  Comments: Spouse - Flower

## 2022-08-09 NOTE — ANESTHESIA POSTPROCEDURE EVALUATION
Patient: Bayron Coleman    Procedure Summary     Date: 08/09/22 Room / Location: [facility] ENDOSCOPY 4 / [facility] ENDOSCOPY    Anesthesia Start: 1342 Anesthesia Stop: 1411    Procedure: ESOPHAGOGASTRODUODENOSCOPY WITH ANESTHESIA (N/A ) Diagnosis:       Failure to thrive in adult      Gastrointestinal hemorrhage, unspecified gastrointestinal hemorrhage type      (Failure to thrive in adult [R62.7])      (Gastrointestinal hemorrhage, unspecified gastrointestinal hemorrhage type [K92.2])    Surgeons: Alex Pal MD Provider: Iraj Sotelo CRNA    Anesthesia Type: MAC ASA Status: 3          Anesthesia Type: MAC    Vitals  Vitals Value Taken Time   /69 08/09/22 1441   Temp     Pulse 104 08/09/22 1443   Resp 15 08/09/22 1435   SpO2 99 % 08/09/22 1435   Vitals shown include unvalidated device data.        Post Anesthesia Care and Evaluation    Patient location during evaluation: PACU  Patient participation: complete - patient participated  Level of consciousness: awake and alert  Pain management: adequate    Airway patency: patent  Anesthetic complications: No anesthetic complications  PONV Status: none  Cardiovascular status: acceptable and hemodynamically stable  Respiratory status: acceptable  Hydration status: acceptable    Comments: Blood pressure 128/63, pulse 112, temperature 98.2 °F (36.8 °C), temperature source Axillary, resp. rate 16, weight 117 kg (258 lb 11.2 oz), SpO2 98 %.    Patient discharged from PACU based upon Miguel score. Please see RN notes for further details

## 2022-08-09 NOTE — PLAN OF CARE
Problem: Adult Inpatient Plan of Care  Goal: Plan of Care Review  Outcome: Ongoing, Progressing  Flowsheets  Taken 2022 0344 by Kriss Duncan RN  Progress: no change  Outcome Evaluation: Patient only oriented to self and . IV CDI, IVF infusing per order. Clear liquid diet, NPO at midnight. No c/o pain or n/v. Tele afib. Bed alarm, SCD's. VSS, safety maintained.

## 2022-08-09 NOTE — PROGRESS NOTES
[facility]  Inpatient Gastroenterology Service  EGD Brief Post Op Progress Note        Patient underwent uncomplicated EGD today.  Please see my procedure note in Provation system for detailed findings and recommendations.    Currently awake, in no distress, and otherwise stable.    No family present in room at time of discussion.  I counseled the patient regarding findings and recommendations.  Questions were entertained.     FINDINGS:  - Normal gastroesophageal junction and esophagus.   - Two medium (6 - 8 mm) deep, cratered, non-bleeding gastric ulcers with no stigmata of bleeding in the posterior antrum.  Biopsied to assess for H. pylori (a bacterium frequently present in the stomach and which may be associated with ulcers and inflammation) and precancerous changes (metaplasia, dysplasia).  - Normal examined duodenum.   - The examined portions of the nasopharynx, oropharynx and larynx were normal.   - The examination was otherwise normal.     RECOMMEND:  - [facility] to [facility] for [facility] care.   - Clear liquid diet post-procedure, then may advance as tolerated to target diet desired by attending physician.  - Await pathology results.   - Continue PPI (pantoprazole) IV for at least 24 hours.  If patient remains stable then may consider transition to oral PPI twice daily.  - Upon discharge treat with Proton Pump Inhibitor (PPI) of choice taken BID for 3 months.  Examples include AcipHex (rabeprazole) 20 mg, Prevacid (lansoprazole) 30 mg, Nexium (esomeprazole) 40 mg, Prilosec (omeprazole) 40 mg, Zegerid (omeprazole-NaHCO3) 40 mg, Protonix (pantoprazole) 40 mg, or Dexilant (lansoprazole) 30 - 60 mg.  N.B. Of the above, pantoprazole has the lowest potency.  - Sucralfate (Carafate) 1 gm (pill or suspension) 30 to 60 minutes before meals and at bedtime for one month.  - Repeat EGD as outpatient in 3 months to confirm healing of ulcer.  - Consider colonoscopy to assess for lower GI source of  bleeding if patient has recurrent bleeding, or if anemia does not respond to iron supplementation in a timely manner.      Thank you for the opportunity to serve you and your patients.  Please call if any questions arise regarding my involvement in this patient's care.    Alex Pal M.D., F.A.C.ZABRINA., F.A.C.JEFF.    [facility] Inpatient Gastroenterology Service

## 2022-08-09 NOTE — PLAN OF CARE
Goal Outcome Evaluation:  Plan of Care Reviewed With: patient           Outcome Evaluation: Pt. had EGD done today. Clear liquid diet; tolerated well. Small BM; black & tarry. IVF infusing. SCD's on pt. Tele on pt. Bed alarm on. Disoriented to place, time, and situation. VSS. Safety maintained.

## 2022-08-09 NOTE — PROGRESS NOTES
[facility] Medicine Services  INPATIENT PROGRESS NOTE    Patient Name: Bayron Coleman  Date of Admission: 8/7/2022  Today's Date: 08/09/22  Length of Stay: 2  Primary Care Physician: Provider, No Known    Subjective   Chief Complaint: follow up GI Bleed  HPI   Doing ok.  No black or bloody stools.  Underwent EGD earlier today, which showed two gastric ulcers        Review of Systems   Constitutional: Positive for fatigue. Negative for fever.   HENT: Negative for congestion and ear pain.    Eyes: Negative for redness and visual disturbance.   Respiratory: Negative for cough, shortness of breath and wheezing.    Cardiovascular: Negative for chest pain and palpitations.   Gastrointestinal: Negative for abdominal pain, diarrhea, nausea and vomiting.   Endocrine: Negative for cold intolerance and heat intolerance.   Genitourinary: Negative for dysuria and frequency.   Musculoskeletal: Negative for arthralgias and back pain.   Skin: Negative for rash and wound.   Neurological: Positive for weakness. Negative for dizziness and headaches.   Psychiatric/Behavioral: Negative for confusion. The patient is not nervous/anxious.           All pertinent negatives and positives are as above. All other systems have been reviewed and are negative unless otherwise stated.     Objective    Temp:  [97.5 °F (36.4 °C)-98.2 °F (36.8 °C)] 98.2 °F (36.8 °C)  Heart Rate:  [] 112  Resp:  [15-26] 16  BP: ()/() 128/63  Physical Exam  Vitals reviewed.   Constitutional:       Appearance: He is well-developed.   HENT:      Head: Normocephalic and atraumatic.      Right Ear: External ear normal.      Left Ear: External ear normal.      Nose: Nose normal.   Eyes:      General: No scleral icterus.        Right eye: No discharge.         Left eye: No discharge.      Conjunctiva/sclera: Conjunctivae normal.      Pupils: Pupils are equal, round, and reactive to light.   Neck:      Thyroid: No  thyromegaly.      Trachea: No tracheal deviation.   Cardiovascular:      Rate and Rhythm: Normal rate and regular rhythm.      Heart sounds: Normal heart sounds. No murmur heard.    No friction rub. No gallop.   Pulmonary:      Effort: Pulmonary effort is normal. No respiratory distress.      Breath sounds: Normal breath sounds. No stridor. No wheezing or rales.   Chest:      Chest wall: No tenderness.   Abdominal:      General: Bowel sounds are normal. There is no distension.      Palpations: Abdomen is soft. There is no mass.      Tenderness: There is no abdominal tenderness. There is no guarding or rebound.      Hernia: No hernia is present.   Musculoskeletal:         General: No deformity. Normal range of motion.      Cervical back: Normal range of motion and neck supple.   Lymphadenopathy:      Cervical: No cervical adenopathy.   Skin:     General: Skin is warm and dry.      Coloration: Skin is not pale.      Findings: No erythema or rash.   Neurological:      Mental Status: He is alert and oriented to person, place, and time.      Cranial Nerves: No cranial nerve deficit.      Motor: No abnormal muscle tone.      Coordination: Coordination normal.      Deep Tendon Reflexes: Reflexes are normal and symmetric. Reflexes normal.   Psychiatric:         Behavior: Behavior normal.         Thought Content: Thought content normal.         Judgment: Judgment normal.           Results Review:  I have reviewed the labs, radiology results, and diagnostic studies.    Laboratory Data:   Results from last 7 days   Lab Units 08/09/22  1221 08/08/22  0748 08/07/22  0633   WBC 10*3/mm3 8.49 15.60* 17.02*   HEMOGLOBIN g/dL 8.4* 9.3* 11.9*   HEMATOCRIT % 26.3* 29.7* 35.2*   PLATELETS 10*3/mm3 206 216 215        Results from last 7 days   Lab Units 08/09/22  1221 08/08/22  1428 08/07/22  0633   SODIUM mmol/L 147* 145 142   POTASSIUM mmol/L 3.7 4.0 4.7   CHLORIDE mmol/L 112* 109* 109*   CO2 mmol/L 26.0 21.0* 20.0*   BUN mg/dL 74*  95* 96*   CREATININE mg/dL 1.47* 1.91* 1.69*   CALCIUM mg/dL 9.1 9.2 9.5   BILIRUBIN mg/dL  --   --  0.4   ALK PHOS U/L  --   --  98   ALT (SGPT) U/L  --   --  19   AST (SGOT) U/L  --   --  13   GLUCOSE mg/dL 123* 142* 172*       Culture Data:   Blood Culture   Date Value Ref Range Status   08/07/2022 No growth at 2 days  Preliminary   08/07/2022 No growth at 2 days  Preliminary       Radiology Data:   Imaging Results (Last 24 Hours)     ** No results found for the last 24 hours. **          I have reviewed the patient's current medications.     Assessment/Plan     Active Hospital Problems    Diagnosis    • Gastric ulcer    • GI bleed    • Renal failure    • Hx of atrial fibrillation, no current medication    • Hypomagnesemia    • Failure to thrive in adult    • Lab test positive for detection of COVID-19 virus            1.  GI Bleed due to PUD  -Protonix  -GI following  -Eliquis held     2.  SHARRON  -IVF     3.  A-fib  -telemetry     4.  Hypomagnesemia  -Mg replaced     5.  Iron deficiency anemia secondary to #1  -Replace Iron          Discharge Planning: I expect the patient to be discharged to     Electronically signed by Chepe Germain MD, 08/09/22, 17:29 CDT.

## 2022-08-09 NOTE — PLAN OF CARE
Problem: Adult Inpatient Plan of Care  Goal: Plan of Care Review  Recent Flowsheet Documentation  Taken 8/9/2022 0952 by Michael Beasley, OTR/L  Progress: no change  Plan of Care Reviewed With:   patient   spouse  Outcome Evaluation: OT tx completed. Pt with much improved alertness and verbal communication from yesterday. Pt oriented to self and date, when given options for year. Pt was supervision with set up and verbal cues to comb hair and wash face. Pt refuses to wash hair. Pt was min A with verbal cues for supine to sit. Pt reports being dizzy once EOB and standing was mentioned. Spouse reports that he usually says he is dizzy when he doesn't want to get up. Pt encouraged and allowed for gait belt to be placed on but once walked placed in front of pt, he abruptly lies back down. Pt is agreeable to sit back up only to get gait belt off but does not agree to further activity. Pt was dependent x2 for scooting utilizing the draw sheet and trendelenberg. Pt request an ice water, stating he even dreamed about it last night. Pt remains NPO though for pending test. Continue OT POC.

## 2022-08-09 NOTE — PLAN OF CARE
Goal Outcome Evaluation:  Plan of Care Reviewed With: patient        Progress: improving  Outcome Evaluation: PT tx completed. Pt agreeable to therapy. Min x1 for supine to sit with HOB elevated and increased time to complete. Sat EOB for 15min. Completed BLE AROM x10 reps. Pt sits with head forward flexed, cues to correct, able to briefly. Max encouragment to attempt standing. Stood x1 rep with mod x1 then able to take 3 side steps to the L with assist for RWX managment. Returned to bed with CGA. Will cont to follow.

## 2022-08-09 NOTE — THERAPY TREATMENT NOTE
Acute Care - Physical Therapy Treatment Note  [facility]     Patient Name: Bayron Coleman  : 1940  MRN: [id number]  Today's Date: 2022      Visit Dx:     ICD-10-CM ICD-9-CM   1. Impaired cognition  R41.89 294.9   2. Impaired mobility  Z74.09 799.89   3. Impaired mobility and ADLs  Z74.09 V49.89    Z78.9    4. Failure to thrive in adult  R62.7 783.7   5. Gastrointestinal hemorrhage, unspecified gastrointestinal hemorrhage type  K92.2 578.9     Patient Active Problem List   Diagnosis   • GI bleed   • Renal failure   • Hx of atrial fibrillation, no current medication   • Hypomagnesemia   • Failure to thrive in adult   • Lab test positive for detection of COVID-19 virus   • Gastric ulcer     Past Medical History:   Diagnosis Date   • Adjustment disorder with withdrawal    • Cardiomyopathy (HCC)    • Carpal tunnel syndrome    • Cerebral infarction (HCC)    • COVID-19    • Dementia (HCC)    • Elevated PSA    • Elevated PSA    • Hyperlipidemia    • Hypertension    • Major depressive disorder    • Osteoarthritis    • Paroxysmal atrial fibrillation (HCC)    • Vitiligo    • Weakness      History reviewed. No pertinent surgical history.  PT Assessment (last 12 hours)     PT Evaluation and Treatment     Row Name 22 1514 22 1330       Physical Therapy Time and Intention    Subjective Information complains of;fatigue  -LY --    Document Type therapy note (daily note)  -LY --    Mode of Treatment physical therapy  -LY physical therapy  -LY    Session Not Performed -- patient unavailable for treatment  -LY    Comment, Session Not Performed -- endo  -LY    Row Name 22 1007          Physical Therapy Time and Intention    Document Type therapy note (daily note)  -LY     Mode of Treatment physical therapy  -LY     Comment, Session Not Performed working with OT  -LY     Row Name 22 1514 22 1007       General Information    Patient/Family/Caregiver Comments/Observations wife present  -LY --     Existing Precautions/Restrictions fall  -LY fall  -LY    Row Name 08/09/22 1514          Pain    Pretreatment Pain Rating 0/10 - no pain  -LY     Posttreatment Pain Rating 0/10 - no pain  -LY     Pain Intervention(s) Medication (See MAR)  -LY     Row Name 08/09/22 1514          Bed Mobility    Supine-Sit [place] (Bed Mobility) verbal cues;minimum assist (75% patient effort)  -LY     Sit-Supine [place] (Bed Mobility) verbal cues;contact guard  -LY     Bed Mobility, Safety Issues cognitive deficits limit understanding  -LY     Assistive Device (Bed Mobility) bed rails;head of bed elevated  -LY     Row Name 08/09/22 1514          Transfers    Transfers sit-stand transfer  -LY     Sit-Stand [place] (Transfers) verbal cues;moderate assist (50% patient effort)  -LY     Row Name 08/09/22 1514          Sit-Stand Transfer    Assistive Device (Sit-Stand Transfers) walker, front-wheeled  -LY     Comment, (Sit-Stand Transfer) agreed to x1 attempt  -LY     Row Name 08/09/22 1514          Gait/Stairs (Locomotion)    [place] Level (Gait) verbal cues;minimum assist (75% patient effort)  -LY     Assistive Device (Gait) walker, front-wheeled  -LY     Distance in Feet (Gait) 3 sides to the L, assist with RWX management  -LY     Row Name 08/09/22 1514          Motor Skills    Comments, Therapeutic Exercise BLE AROM x10 reps seated  -LY     Additional Documentation Comments, Therapeutic Exercise (Row)  -LY     Row Name 08/09/22 1514          Plan of Care Review    Plan of Care Reviewed With patient  -LY     Progress improving  -LY     Outcome Evaluation PT tx completed. Pt agreeable to therapy. Min x1 for supine to sit with HOB elevated and increased time to complete. Sat EOB for 15min. Completed BLE AROM x10 reps. Pt sits with head forward flexed, cues to correct, able to briefly. Max encouragment to attempt standing. Stood x1 rep with mod x1 then able to take 3 side steps to the L with assist for RWX managment.  Returned to bed with CGA. Will cont to follow.  -LY     Row Name 08/09/22 1514          Positioning and Restraints    Pre-Treatment Position in bed  -LY     Post Treatment Position bed  -LY     In Bed side lying left;fowlers;call light within reach;encouraged to call for assist;exit alarm on;with family/caregiver  -LY           User Key  (r) = Recorded By, (t) = Taken By, (c) = Cosigned By    Initials Name Provider Type    LY Mary Dillon, PTA Physical Therapist Assistant                Physical Therapy Education                 Title: PT OT SLP Therapies (In Progress)     Topic: Physical Therapy (In Progress)     Point: Mobility training (Done)     Learning Progress Summary           Patient Acceptance, E, NR,VU by SC at 8/8/2022 0767    Comment: Pt was educated on the benefits of getting up out of bed and moving.                   Point: Home exercise program (Not Started)     Learner Progress:  Not documented in this visit.          Point: Body mechanics (Not Started)     Learner Progress:  Not documented in this visit.          Point: Precautions (Not Started)     Learner Progress:  Not documented in this visit.                      User Key     Initials Effective Dates Name Provider Type Discipline    SC 07/18/22 -  Fe Hayes, PT Student PT Student PT              PT Recommendation and Plan     Plan of Care Reviewed With: patient  Progress: improving  Outcome Evaluation: PT tx completed. Pt agreeable to therapy. Min x1 for supine to sit with HOB elevated and increased time to complete. Sat EOB for 15min. Completed BLE AROM x10 reps. Pt sits with head forward flexed, cues to correct, able to briefly. Max encouragment to attempt standing. Stood x1 rep with mod x1 then able to take 3 side steps to the L with assist for RWX managment. Returned to bed with CGA. Will cont to follow.   Outcome Measures     Row Name 08/09/22 1514             How much help from another person do you currently need...    Turning  from your back to your side while in flat bed without using bedrails? 3  -LY      Moving from lying on back to sitting on the side of a flat bed without bedrails? 3  -LY      Moving to and from a bed to a chair (including a wheelchair)? 2  -LY      Standing up from a chair using your arms (e.g., wheelchair, bedside chair)? 2  -LY      Climbing 3-5 steps with a railing? 2  -LY      To walk in hospital room? 2  -LY      AM-PAC 6 Clicks Score (PT) 14  -LY              Functional Assessment    Outcome Measure Options AM-PAC 6 Clicks Basic Mobility (PT)  -LY            User Key  (r) = Recorded By, (t) = Taken By, (c) = Cosigned By    Initials Name Provider Type    Mary Galindo PTA Physical Therapist Assistant                 Time Calculation:    PT Charges     Row Name 08/09/22 1545             Time Calculation    Start Time 1514  -LY      Stop Time 1539  -LY      Time Calculation (min) 25 min  -LY      PT Received On 08/09/22  -LY              Time Calculation- PT    Total Timed Code Minutes- PT 25 minute(s)  -LY              Timed Charges    02751 - PT Therapeutic Activity Minutes 25  -LY              Total Minutes    Timed Charges Total Minutes 25  -LY       Total Minutes 25  -LY            User Key  (r) = Recorded By, (t) = Taken By, (c) = Cosigned By    Initials Name Provider Type    Mary Galindo PTA Physical Therapist Assistant              Therapy Charges for Today     Code Description Service Date Service Provider Modifiers Qty    [id number]  PT THERAPEUTIC ACT EA 15 MIN 8/9/2022 Mary Dillon PTA GP 2          PT G-Codes  Outcome Measure Options: AM-PAC 6 Clicks Basic Mobility (PT)  AM-PAC 6 Clicks Score (PT): 14  AM-PAC 6 Clicks Score (OT): 11    Mary Dillon PTA  8/9/2022

## 2022-08-09 NOTE — PROGRESS NOTES
Patient evaluated this morning.  Wife present at bedside.  We will proceed with EGD evaluation today.  Further recommendation pending findings on EGD.  Risk benefits indication reviewed with patient's wife, she was agreeable to proceed.

## 2022-08-10 ENCOUNTER — READMISSION MANAGEMENT (OUTPATIENT)
Dept: CALL CENTER | Facility: HOSPITAL | Age: 82
End: 2022-08-10

## 2022-08-10 VITALS
TEMPERATURE: 98.1 F | SYSTOLIC BLOOD PRESSURE: 116 MMHG | RESPIRATION RATE: 16 BRPM | WEIGHT: 258.7 LBS | OXYGEN SATURATION: 91 % | HEART RATE: 90 BPM | DIASTOLIC BLOOD PRESSURE: 56 MMHG

## 2022-08-10 LAB
ANION GAP SERPL CALCULATED.3IONS-SCNC: 6 MMOL/L (ref 5–15)
BUN SERPL-MCNC: 49 MG/DL (ref 8–23)
BUN/CREAT SERPL: 42.6 (ref 7–25)
CALCIUM SPEC-SCNC: 8.5 MG/DL (ref 8.6–10.5)
CHLORIDE SERPL-SCNC: 110 MMOL/L (ref 98–107)
CO2 SERPL-SCNC: 28 MMOL/L (ref 22–29)
CREAT SERPL-MCNC: 1.15 MG/DL (ref 0.76–1.27)
DEPRECATED RDW RBC AUTO: 48.6 FL (ref 37–54)
EGFRCR SERPLBLD CKD-EPI 2021: 63.5 ML/MIN/1.73
ERYTHROCYTE [DISTWIDTH] IN BLOOD BY AUTOMATED COUNT: 14 % (ref 12.3–15.4)
GLUCOSE SERPL-MCNC: 90 MG/DL (ref 65–99)
HCT VFR BLD AUTO: 22.7 % (ref 37.5–51)
HCT VFR BLD AUTO: 26 % (ref 37.5–51)
HGB BLD-MCNC: 7.2 G/DL (ref 13–17.7)
HGB BLD-MCNC: 7.8 G/DL (ref 13–17.7)
MCH RBC QN AUTO: 30.8 PG (ref 26.6–33)
MCHC RBC AUTO-ENTMCNC: 31.7 G/DL (ref 31.5–35.7)
MCV RBC AUTO: 97 FL (ref 79–97)
PLATELET # BLD AUTO: 186 10*3/MM3 (ref 140–450)
PMV BLD AUTO: 11.4 FL (ref 6–12)
POTASSIUM SERPL-SCNC: 3.3 MMOL/L (ref 3.5–5.2)
RBC # BLD AUTO: 2.34 10*6/MM3 (ref 4.14–5.8)
SODIUM SERPL-SCNC: 144 MMOL/L (ref 136–145)
WBC NRBC COR # BLD: 8.11 10*3/MM3 (ref 3.4–10.8)

## 2022-08-10 PROCEDURE — 85027 COMPLETE CBC AUTOMATED: CPT | Performed by: FAMILY MEDICINE

## 2022-08-10 PROCEDURE — 99497 ADVNCD CARE PLAN 30 MIN: CPT

## 2022-08-10 PROCEDURE — 97535 SELF CARE MNGMENT TRAINING: CPT

## 2022-08-10 PROCEDURE — 85014 HEMATOCRIT: CPT | Performed by: FAMILY MEDICINE

## 2022-08-10 PROCEDURE — 99232 SBSQ HOSP IP/OBS MODERATE 35: CPT | Performed by: NURSE PRACTITIONER

## 2022-08-10 PROCEDURE — 80048 BASIC METABOLIC PNL TOTAL CA: CPT | Performed by: FAMILY MEDICINE

## 2022-08-10 PROCEDURE — 97530 THERAPEUTIC ACTIVITIES: CPT

## 2022-08-10 PROCEDURE — 85018 HEMOGLOBIN: CPT | Performed by: FAMILY MEDICINE

## 2022-08-10 PROCEDURE — 99233 SBSQ HOSP IP/OBS HIGH 50: CPT

## 2022-08-10 PROCEDURE — 97110 THERAPEUTIC EXERCISES: CPT

## 2022-08-10 RX ORDER — POTASSIUM CHLORIDE 750 MG/1
40 CAPSULE, EXTENDED RELEASE ORAL ONCE
Status: COMPLETED | OUTPATIENT
Start: 2022-08-10 | End: 2022-08-10

## 2022-08-10 RX ORDER — PANTOPRAZOLE SODIUM 40 MG/1
40 TABLET, DELAYED RELEASE ORAL DAILY
Qty: 90 TABLET | Refills: 1 | Status: SHIPPED | OUTPATIENT
Start: 2022-08-10

## 2022-08-10 RX ORDER — FERROUS SULFATE 300 MG/5ML
300 LIQUID (ML) ORAL 2 TIMES DAILY
Qty: 300 ML | Refills: 1 | Status: SHIPPED | OUTPATIENT
Start: 2022-08-10

## 2022-08-10 RX ORDER — SUCRALFATE 1 G/1
1 TABLET ORAL 4 TIMES DAILY
Qty: 360 TABLET | Refills: 0 | Status: SHIPPED | OUTPATIENT
Start: 2022-08-10

## 2022-08-10 RX ADMIN — SODIUM CHLORIDE, POTASSIUM CHLORIDE, SODIUM LACTATE AND CALCIUM CHLORIDE 75 ML/HR: 600; 310; 30; 20 INJECTION, SOLUTION INTRAVENOUS at 06:28

## 2022-08-10 RX ADMIN — Medication 10 ML: at 09:27

## 2022-08-10 RX ADMIN — POTASSIUM CHLORIDE 40 MEQ: 10 CAPSULE, COATED, EXTENDED RELEASE ORAL at 16:04

## 2022-08-10 RX ADMIN — PANTOPRAZOLE SODIUM 40 MG: 40 INJECTION, POWDER, FOR SOLUTION INTRAVENOUS at 09:28

## 2022-08-10 NOTE — THERAPY TREATMENT NOTE
Patient Name: Bayron Coleman  : 1940    MRN: [id number]                              Today's Date: 8/10/2022       Admit Date: 2022    Visit Dx:     ICD-10-CM ICD-9-CM   1. Impaired cognition  R41.89 294.9   2. Impaired mobility  Z74.09 799.89   3. Impaired mobility and ADLs  Z74.09 V49.89    Z78.9    4. Failure to thrive in adult  R62.7 783.7   5. Gastrointestinal hemorrhage, unspecified gastrointestinal hemorrhage type  K92.2 578.9     Patient Active Problem List   Diagnosis   • GI bleed   • Renal failure   • Hx of atrial fibrillation, no current medication   • Hypomagnesemia   • Failure to thrive in adult   • Lab test positive for detection of COVID-19 virus   • Gastric ulcer     Past Medical History:   Diagnosis Date   • Adjustment disorder with withdrawal    • Cardiomyopathy (HCC)    • Carpal tunnel syndrome    • Cerebral infarction (HCC)    • COVID-19    • Dementia (HCC)    • Elevated PSA    • Elevated PSA    • Hyperlipidemia    • Hypertension    • Major depressive disorder    • Osteoarthritis    • Paroxysmal atrial fibrillation (HCC)    • Vitiligo    • Weakness      Past Surgical History:   Procedure Laterality Date   • ENDOSCOPY N/A 2022    Procedure: ESOPHAGOGASTRODUODENOSCOPY WITH ANESTHESIA;  Surgeon: Alex Pal MD;  Location: [facility] ENDOSCOPY;  Service: Gastroenterology;  Laterality: N/A;  preop; GI bleed   postop gastric ulcers  PCP none       General Information     Row Name 08/10/22 1123          OT Time and Intention    Document Type therapy note (daily note)  -LS     Mode of Treatment occupational therapy  -LS     Row Name 08/10/22 1123          General Information    Patient Profile Reviewed yes  -LS     Existing Precautions/Restrictions fall  -LS     Barriers to Rehab cognitive status  -LS     Row Name 08/10/22 1123          Cognition    Orientation Status (Cognition) oriented to;person  -LS     Row Name 08/10/22 1123          Safety Issues, Functional Mobility    Safety Issues  Affecting Function (Mobility) at risk behavior observed;safety precaution awareness;safety precautions follow-through/compliance  -     Impairments Affecting Function (Mobility) balance;coordination;endurance/activity tolerance;strength  -     Cognitive Impairments, Mobility Safety/Performance safety precaution awareness;problem-solving/reasoning  -           User Key  (r) = Recorded By, (t) = Taken By, (c) = Cosigned By    Initials Name Provider Type     Mary Zambrano, OTR/L Occupational Therapist                 Mobility/ADL's     Row Name 08/10/22 1123          Transfers    Transfers sit-stand transfer;stand-sit transfer;toilet transfer  -     Sit-Stand Dennis (Transfers) moderate assist (50% patient effort)  -     Stand-Sit [place] (Transfers) minimum assist (75% patient effort)  -     [place] Level (Toilet Transfer) moderate assist (50% patient effort)  -     Assistive Device (Toilet Transfer) walker, front-wheeled;grab bars/safety frame  -     Row Name 08/10/22 1123          Sit-Stand Transfer    Assistive Device (Sit-Stand Transfers) walker, front-wheeled  -     Row Name 08/10/22 [facility]          Stand-Sit Transfer    Assistive Device (Stand-Sit Transfers) walker, front-wheeled  -[facility] Name 08/10/22 [facility]          Toilet Transfer    Type (Toilet Transfer) sit-stand;stand-sit  -           User Key  (r) = Recorded By, (t) = Taken By, (c) = Cosigned By    Initials Name Provider Type     Mary Zambrano, OTR/L Occupational Therapist               Obj/Interventions    No documentation.                Goals/Plan    No documentation.                Clinical Impression     [facility] Name 08/10/22 1123          Pain Assessment    Additional Documentation Pain Scale: FACES Pre/Post-Treatment (Group)  -[facility] Name 08/10/22 1123          Pain Scale: FACES Pre/Post-Treatment    Pain: FACES Scale, Pretreatment 0-->no hurt  -     Posttreatment Pain Rating 0-->no hurt  -     Row Name  08/10/22 1123          Plan of Care Review    Plan of Care Reviewed With patient;spouse  -LS     Progress improving  -LS     Outcome Evaluation OT tx completed. Pt seated in recliner upon therapist arrival; A&O to person only; Pt's wife also present in room. Pt performed sit<>stand utilizing RW with Mod A. Pt ambulated from recliner<>BR utilizing RW with CGA; Pt required extended seated rest break on toilet prior to ambulating back to recliner due to increased fatigue and SOB. Pt performed sit<>stand toilet transfer utilizing RW and grab bar with Mod A. Educated Pt on safety/fall precautions; Pt verbalized understanding. Pt continues to benefit from skilled OT intervention in order to address remaining deficits in fxl mobility, fxl activity tolerance, balance, coordination, and strength which currently prevents the Pt from performing daily activities safely at [facility]. Recommend SNF at discharge.  -     Row Name 08/10/22 1123          Therapy Plan Review/Discharge Plan (OT)    Anticipated Discharge Disposition (OT) skilled nursing facility  -     Row Name 08/10/22 1123          Positioning and Restraints    Pre-Treatment Position sitting in chair/recliner  -LS     Post Treatment Position chair  -LS     In Chair sitting;call light within reach;encouraged to call for assist;exit alarm on;with family/caregiver  -LS           User Key  (r) = Recorded By, (t) = Taken By, (c) = Cosigned By    Initials Name Provider Type    Mary Lopez, OTR/L Occupational Therapist               Outcome Measures     Row Name 08/10/22 1123          How much help from another is currently needed...    Putting on and taking off regular lower body clothing? 2  -LS     Bathing (including washing, rinsing, and drying) 2  -LS     Toileting (which includes using toilet bed pan or urinal) 2  -LS     Putting on and taking off regular upper body clothing 3  -LS     Taking care of personal grooming (such as brushing teeth) 3  -LS     Eating  meals 3  -LS     AM-PAC 6 Clicks Score (OT) 15  -LS     Row Name 08/10/22 0835          How much help from another person do you currently need...    Turning from your back to your side while in flat bed without using bedrails? 3  -LY     Moving from lying on back to sitting on the side of a flat bed without bedrails? 3  -LY     Moving to and from a bed to a chair (including a wheelchair)? 3  -LY     Standing up from a chair using your arms (e.g., wheelchair, bedside chair)? 3  -LY     Climbing 3-5 steps with a railing? 2  -LY     To walk in hospital room? 3  -LY     AM-PAC 6 Clicks Score (PT) 17  -LY     Highest level of mobility 5 --> Static standing  -LY     Row Name 08/10/22 1123 08/10/22 0835       Functional Assessment    Outcome Measure Options AM-PAC 6 Clicks Daily Activity (OT)  -LS AM-PAC 6 Clicks Basic Mobility (PT)  -LY          User Key  (r) = Recorded By, (t) = Taken By, (c) = Cosigned By    Initials Name Provider Type    LY Mary Dillon, PTA Physical Therapist Assistant    Mary Lopez, OTR/L Occupational Therapist                Occupational Therapy Education                 Title: PT OT SLP Therapies (In Progress)     Topic: Occupational Therapy (In Progress)     Point: ADL training (In Progress)     Description:   Instruct learner(s) on proper safety adaptation and remediation techniques during self care or transfers.   Instruct in proper use of assistive devices.              Learning Progress Summary           Patient Acceptance, E, VU,NR by LS at 8/10/2022 1317    Acceptance, E, NR by MM at 8/9/2022 1043    Comment: OT POC, need for assist, benefits of activity    Acceptance, E, VU by MM at 8/8/2022 1402    Comment: OT role, benefits, POC, d/c planning   Family Acceptance, E, NR by MM at 8/9/2022 1043    Comment: OT POC, need for assist, benefits of activity    Acceptance, E, VU by MM at 8/8/2022 1402    Comment: OT role, benefits, POC, d/c planning                   Point: Home exercise  program (Not Started)     Description:   Instruct learner(s) on appropriate technique for monitoring, assisting and/or progressing therapeutic exercises/activities.              Learner Progress:  Not documented in this visit.          Point: Precautions (In Progress)     Description:   Instruct learner(s) on prescribed precautions during self-care and functional transfers.              Learning Progress Summary           Patient Acceptance, E, VU,NR by LS at 8/10/2022 1317    Acceptance, E, NR by MM at 8/9/2022 1043    Comment: OT POC, need for assist, benefits of activity   Family Acceptance, E, NR by MM at 8/9/2022 1043    Comment: OT POC, need for assist, benefits of activity                   Point: Body mechanics (In Progress)     Description:   Instruct learner(s) on proper positioning and spine alignment during self-care, functional mobility activities and/or exercises.              Learning Progress Summary           Patient Acceptance, E, NR by MM at 8/9/2022 1043    Comment: OT POC, need for assist, benefits of activity   Family Acceptance, E, NR by MM at 8/9/2022 1043    Comment: OT POC, need for assist, benefits of activity                               User Key     Initials Effective Dates Name Provider Type Discipline     06/16/21 -  Michael Beasley, OTR/L Occupational Therapist OT     06/20/22 -  Mary Zambrano OTR/L Occupational Therapist OT              OT Recommendation and Plan     Plan of Care Review  Plan of Care Reviewed With: patient, spouse  Progress: improving  Outcome Evaluation: OT tx completed. Pt seated in recliner upon therapist arrival; A&O to person only; Pt's wife also present in room. Pt performed sit<>stand utilizing RW with Mod A. Pt ambulated from recliner<>BR utilizing RW with CGA; Pt required extended seated rest break on toilet prior to ambulating back to recliner due to increased fatigue and SOB. Pt performed sit<>stand toilet transfer utilizing RW and grab bar with  Mod A. Educated Pt on safety/fall precautions; Pt verbalized understanding. Pt continues to benefit from skilled OT intervention in order to address remaining deficits in fxl mobility, fxl activity tolerance, balance, coordination, and strength which currently prevents the Pt from performing daily activities safely at [facility]. Recommend SNF at discharge.     Time Calculation:    Time Calculation- OT     Row Name 08/10/22 1123             Time Calculation- OT    OT Start Time 1123  -LS      OT Stop Time 1148  -LS      OT Time Calculation (min) 25 min  -      Total Timed Code Minutes- OT 25 minute(s)  -      OT Received On 08/10/22  -            User Key  (r) = Recorded By, (t) = Taken By, (c) = Cosigned By    Initials Name Provider Type     Mary Zambrano OTR/L Occupational Therapist              Therapy Charges for Today     Code Description Service Date Service Provider Modifiers Qty    46804190509 HC OT SELF CARE/MGMT/TRAIN EA 15 MIN 8/10/2022 Mary Zambrano OTR/L GO 2               JONATHAN Gomes/MORIAH  8/10/2022

## 2022-08-10 NOTE — PLAN OF CARE
Goal Outcome Evaluation:  Plan of Care Reviewed With: patient        Progress: improving  Outcome Evaluation: PT tx completed. Pt in very good spirits this AM. CGA for supine to sit with HOB elevated and increased time to complete. Performed BLE AROM x20 reps seated. Min x1 to take 5 steps to chair d/t assist with RWX management. Able to stand  with RWX and min x1 a total of 5 reps. Pt very fatigued after last stand and deferred further activity. Will cont to follow.

## 2022-08-10 NOTE — PLAN OF CARE
Goal Outcome Evaluation:  Plan of Care Reviewed With: patient, spouse        Progress: improving  Outcome Evaluation: See note

## 2022-08-10 NOTE — PLAN OF CARE
Goal Outcome Evaluation:  Plan of Care Reviewed With: patient, spouse        Progress: improving  Outcome Evaluation: OT tx completed. Pt seated in recliner upon therapist arrival; A&O to person only; Pt's wife also present in room. Pt performed sit<>stand utilizing RW with Mod A. Pt ambulated from recliner<>BR utilizing RW with CGA; Pt required extended seated rest break on toilet prior to ambulating back to recliner due to increased fatigue and SOB. Pt performed sit<>stand toilet transfer utilizing RW and grab bar with Mod A. Educated Pt on safety/fall precautions; Pt verbalized understanding. Pt continues to benefit from skilled OT intervention in order to address remaining deficits in fxl mobility, fxl activity tolerance, balance, coordination, and strength which currently prevents the Pt from performing daily activities safely at [facility]. Recommend SNF at discharge.

## 2022-08-10 NOTE — PLAN OF CARE
Goal Outcome Evaluation:              Outcome Evaluation: Patient has had no c/o pain so far this shift. Disoriented to time, place, and situation. IVF infusing. Bed alarm on.

## 2022-08-10 NOTE — DISCHARGE SUMMARY
[facility] Medicine Services  DISCHARGE SUMMARY       Date of Admission: 8/7/2022  Date of Discharge:  8/10/2022  Primary Care Physician: Provider, No Known    Presenting Problem/Chief Complaint:  GI Bleeding    Final Discharge Diagnoses:  Active Hospital Problems    Diagnosis    • Gastric ulcer    • GI bleed    • Renal failure    • Hx of atrial fibrillation, no current medication    • Hypomagnesemia    • Failure to thrive in adult    • Lab test positive for detection of COVID-19 virus        Consults:   1.  GI  2.  Palliative Care    Procedures Performed:   1.  EGD    Pertinent Test Results:   EGD:  Impression:  - Normal gastroesophageal junction and esophagus.  - Two medium (6 - 8 mm) deep, cratered, non-bleeding gastric ulcers with no stigmata of bleeding in the  posterior antrum. Biopsied to assess for H. pylori (a bacterium frequently present in the stomach and  which may be associated with ulcers and inflammation) and precancerous changes (metaplasia,  dysplasia).  - Normal examined duodenum.  - The examined portions of the nasopharynx, oropharynx and larynx were normal.  - The examination was otherwise normal.      Imaging Results (All)     Procedure Component Value Units Date/Time    XR Chest 1 View [[id number]] Collected: 08/07/22 1404     Updated: 08/07/22 1408    Narrative:      EXAMINATION: XR CHEST 1 VW- 8/7/2022 2:04 PM CDT     HISTORY: covid positive.     REPORT: A frontal view of the chest was obtained.     COMPARISON: There are no correlative imaging studies for comparison.     Lungs are moderately hypoaerated, no infiltrate is identified. There is  borderline cardiomegaly and no evidence of CHF. No pneumothorax or  pleural effusion is identified. The osseous structures and upper abdomen  appear unremarkable.       Impression:      Pulmonary hypoventilation, no acute infiltrates.  This report was finalized on 08/07/2022 14:05 by Dr. Reuben Ridley MD.           LAB RESULTS:      Lab 08/10/22  1208 08/10/22  0453 08/09/22  1221 08/08/22  0748 08/07/22  2145 08/07/22  1523 08/07/22  1322 08/07/22  0914 08/07/22  0633   WBC  --  8.11 8.49 15.60*  --   --   --   --  17.02*   HEMOGLOBIN 7.8* 7.2* 8.4* 9.3*  --   --   --   --  11.9*   HEMATOCRIT 26.0* 22.7* 26.3* 29.7*  --   --   --   --  35.2*   PLATELETS  --  186 206 216  --   --   --   --  215   NEUTROS ABS  --   --   --  14.02*  --   --   --   --  15.20*   IMMATURE GRANS (ABS)  --   --   --  0.14*  --   --   --   --  0.15*   LYMPHS ABS  --   --   --  0.76  --   --   --   --  0.86   MONOS ABS  --   --   --  0.63  --   --   --   --  0.76   EOS ABS  --   --   --  0.02  --   --   --   --  0.01   MCV  --  97.0 97.0 97.4*  --   --   --   --  91.9   LACTATE  --   --   --   --  3.8* 2.6* 2.9* 2.4* 2.5*         Lab 08/10/22  0453 08/09/22  1221 08/08/22  1428 08/08/22  0748 08/07/22  0633   SODIUM 144 147* 145  --  142   POTASSIUM 3.3* 3.7 4.0  --  4.7   CHLORIDE 110* 112* 109*  --  109*   CO2 28.0 26.0 21.0*  --  20.0*   ANION GAP 6.0 9.0 15.0  --  13.0   BUN 49* 74* 95*  --  96*   CREATININE 1.15 1.47* 1.91*  --  1.69*   EGFR 63.5 47.3* 34.6*  --  40.0*   GLUCOSE 90 123* 142*  --  172*   CALCIUM 8.5* 9.1 9.2  --  9.5   MAGNESIUM  --   --   --  1.8 1.5*   PHOSPHORUS  --   --   --   --  3.6   HEMOGLOBIN A1C  --   --   --  6.30*  --    TSH  --   --   --  2.480  --          Lab 08/07/22  0633   TOTAL PROTEIN 6.2   ALBUMIN 3.10*   GLOBULIN 3.1   ALT (SGPT) 19   AST (SGOT) 13   BILIRUBIN 0.4   ALK PHOS 98         Lab 08/07/22 0633   TROPONIN T 0.080*         Lab 08/08/22  0748   CHOLESTEROL 88   LDL CHOL 41   HDL CHOL 29*   TRIGLYCERIDES 88         Lab 08/07/22  0633   IRON 114   IRON SATURATION 44   TIBC 259*   TRANSFERRIN 174*         Brief Urine Lab Results  (Last result in the past 365 days)      Color   Clarity   Blood   Leuk Est   Nitrite   Protein   CREAT   Urine HCG        08/07/22 1449 Dark Yellow   Clear   Negative    Small (1+)   Negative   Negative               Microbiology Results (last 10 days)     Procedure Component Value - Date/Time    Blood Culture - Blood, Hand, Right [[id number]]  (Normal) Collected: 08/07/22 1523    Lab Status: Preliminary result Specimen: Blood from Hand, Right Updated: 08/09/22 1545     Blood Culture No growth at 2 days    Blood Culture - Blood, Hand, Left [[id number]]  (Normal) Collected: 08/07/22 1523    Lab Status: Preliminary result Specimen: Blood from Hand, Left Updated: 08/09/22 1545     Blood Culture No growth at 2 days    Respiratory Panel PCR w/COVID-19(SARS-CoV-2) GENA/CHRISTIANO/ROSALIA/PAD/COR/MAD/NORBERTO In-House, NP Swab in UTM/VTM, 3-4 HR TAT - Swab, Nasopharynx [[id number]]  (Normal) Collected: 08/07/22 1439    Lab Status: Final result Specimen: Swab from Nasopharynx Updated: 08/07/22 1600     ADENOVIRUS, PCR Not Detected     Coronavirus 229E Not Detected     Coronavirus HKU1 Not Detected     Coronavirus NL63 Not Detected     Coronavirus OC43 Not Detected     COVID19 Not Detected     Human Metapneumovirus Not Detected     Human Rhinovirus/Enterovirus Not Detected     Influenza A PCR Not Detected     Influenza B PCR Not Detected     Parainfluenza Virus 1 Not Detected     Parainfluenza Virus 2 Not Detected     Parainfluenza Virus 3 Not Detected     Parainfluenza Virus 4 Not Detected     RSV, PCR Not Detected     Bordetella pertussis pcr Not Detected     Bordetella parapertussis PCR Not Detected     Chlamydophila pneumoniae PCR Not Detected     Mycoplasma pneumo by PCR Not Detected    Narrative:      In the setting of a positive respiratory panel with a viral infection PLUS a negative procalcitonin without other underlying concern for bacterial infection, consider observing off antibiotics or discontinuation of antibiotics and continue supportive care. If the respiratory panel is positive for atypical bacterial infection (Bordetella pertussis, Chlamydophila pneumoniae, or Mycoplasma pneumoniae), consider  "antibiotic de-escalation to target atypical bacterial infection.          Chief Complaint on Day of Discharge: \"I want to go home.\"    History of Present Illness on Day of Discharge:   Doing ok, no black or bloody stools.  Wants to go home.     Hospital Course:  The patient is a 81 y.o. male who presented to [facility] with GI Bleeding.  He was admitted to the hospital.  GI was consulted.  He was placed on IV protonix.      He underwent EGD which showed the results above.      GI recommends he continue on a BID PPI.  They also recommend he start Carafate.  We will also add iron tablets.      His hgb is down slightly.  It was as low as 7.2 this AM, but on recheck it's 7.8.  I offered that we could watch him another night, but he and his wife prefer to go home.  They understand to go to the ER should he develop black or bloody stools.     Condition on Discharge:  stable    Physical Exam on Discharge:  /56 (BP Location: Right arm, Patient Position: Lying)   Pulse 90   Temp 98.1 °F (36.7 °C) (Oral)   Resp 16   Wt 117 kg (258 lb 11.2 oz)   SpO2 91%   Physical Exam  Vitals reviewed.   Constitutional:       Appearance: He is well-developed.   HENT:      Head: Normocephalic and atraumatic.      Right Ear: External ear normal.      Left Ear: External ear normal.      Nose: Nose normal.   Eyes:      General: No scleral icterus.        Right eye: No discharge.         Left eye: No discharge.      Conjunctiva/sclera: Conjunctivae normal.      Pupils: Pupils are equal, round, and reactive to light.   Neck:      Thyroid: No thyromegaly.      Trachea: No tracheal deviation.   Cardiovascular:      Rate and Rhythm: Normal rate and regular rhythm.      Heart sounds: Normal heart sounds. No murmur heard.    No friction rub. No gallop.   Pulmonary:      Effort: Pulmonary effort is normal. No respiratory distress.      Breath sounds: Normal breath sounds. No stridor. No wheezing or rales.   Chest:      Chest wall: " No tenderness.   Abdominal:      General: Bowel sounds are normal. There is no distension.      Palpations: Abdomen is soft. There is no mass.      Tenderness: There is no abdominal tenderness. There is no guarding or rebound.      Hernia: No hernia is present.   Musculoskeletal:         General: No deformity. Normal range of motion.      Cervical back: Normal range of motion and neck supple.   Lymphadenopathy:      Cervical: No cervical adenopathy.   Skin:     General: Skin is warm and dry.      Coloration: Skin is not pale.      Findings: No erythema or rash.   Neurological:      Mental Status: He is alert and oriented to person, place, and time.      Cranial Nerves: No cranial nerve deficit.      Motor: No abnormal muscle tone.      Coordination: Coordination normal.      Deep Tendon Reflexes: Reflexes are normal and symmetric. Reflexes normal.   Psychiatric:         Behavior: Behavior normal.         Thought Content: Thought content normal.         Judgment: Judgment normal.           Discharge Disposition:  Home with home health  Home or Self Care    Discharge Medications:     Discharge Medications      New Medications      Instructions Start Date   ferrous sulfate 300 (60 Fe) MG/5ML syrup   300 mg, Oral, 2 Times Daily      pantoprazole 40 MG EC tablet  Commonly known as: Protonix   40 mg, Oral, Daily      sucralfate 1 g tablet  Commonly known as: Carafate   1 g, Oral, 4 Times Daily         Continue These Medications      Instructions Start Date   apixaban 5 MG tablet tablet  Commonly known as: ELIQUIS   5 mg, Oral, 2 Times Daily      aspirin 81 MG EC tablet   81 mg, Oral, Daily      atorvastatin 80 MG tablet  Commonly known as: LIPITOR   40 mg, Oral, Nightly, Takes 1/2 tab daily      cholecalciferol 25 MCG (1000 UT) tablet  Commonly known as: VITAMIN D3   3,000 Units, Oral, Daily      donepezil 10 MG tablet  Commonly known as: ARICEPT   10 mg, Oral, Nightly      escitalopram 20 MG tablet  Commonly known as:  LEXAPRO   10 mg, Oral, Daily, Takes 1/2 tab daily      losartan 100 MG tablet  Commonly known as: COZAAR   100 mg, Oral, Daily      metoprolol succinate  MG 24 hr tablet  Commonly known as: TOPROL-XL   50 mg, Oral, Daily, Takes 1/2 tab daily      potassium chloride ER 20 MEQ tablet controlled-release ER tablet  Commonly known as: K-TAB   20 mEq, Oral, Daily      venlafaxine  MG 24 hr capsule  Commonly known as: EFFEXOR-XR   150 mg, Oral, Daily             Discharge Diet: regular    Activity at Discharge: as tolerated    Discharge Care Plan/Instructions:   Hold Eliquis for 2 more days  Follow up with PCP in 1 week  Follow up with GI in 2 weeks  Go to ER for black or bloody stools or vomiting blood    Follow-up Appointments:   No future appointments.    Test Results Pending at Discharge: n/a    Electronically signed by Chepe Germain MD, 08/10/22, 14:48 CDT.    Time: 35 minutes

## 2022-08-10 NOTE — PROGRESS NOTES
[facility]  Inpatient Progress Note      Date of Admission: 8/7/2022  Date of Service:  08/10/22    Reason for Follow Up: GI bleed    Subjective     Subjective:   Patient lying in bed, no family present at bedside.  He is tolerating regular diet without difficulty.  He specifically denies abdominal pain.    EGD 8/9/2022 -gastric ulcer      Current Facility-Administered Medications:   •  acetaminophen (TYLENOL) suppository 650 mg, 650 mg, Rectal, Q4H PRN, Radha Sal DO  •  atorvastatin (LIPITOR) tablet 40 mg, 40 mg, Oral, Nightly, Chepe Germain MD, 40 mg at 08/09/22 2044  •  donepezil (ARICEPT) tablet 10 mg, 10 mg, Oral, Nightly, Chepe Germain MD, 10 mg at 08/09/22 2044  •  escitalopram (LEXAPRO) tablet 10 mg, 10 mg, Oral, Daily, Chepe Germain MD, 10 mg at 08/09/22 1552  •  lactated ringers infusion, 75 mL/hr, Intravenous, Continuous, Radha Sal DO, Last Rate: 75 mL/hr at 08/10/22 0628, 75 mL/hr at 08/10/22 0628  •  ondansetron (ZOFRAN) injection 4 mg, 4 mg, Intravenous, Q6H PRN, Radha Sal DO  •  [COMPLETED] pantoprazole (PROTONIX) injection 80 mg, 80 mg, Intravenous, Q12H, 80 mg at 08/09/22 2308 **FOLLOWED BY** pantoprazole (PROTONIX) injection 40 mg, 40 mg, Intravenous, Q12H, Kevin Hernandez MD, 40 mg at 08/10/22 0928  •  sodium chloride 0.9 % flush 10 mL, 10 mL, Intravenous, Q12H, Radha Sal DO, 10 mL at 08/10/22 0927  •  sodium chloride 0.9 % flush 10 mL, 10 mL, Intravenous, PRN, Radha Sal DO  •  sodium chloride 0.9 % infusion, 100 mL/hr, Intravenous, Continuous, Aisha Tomas MD, Stopped at 08/09/22 1448    Review of Systems     Constitution:  negative for chills, positive fatigue and negative fevers  Eyes:  negative for blurriness and change of vision  ENT:   negative for sore throat and voice change  Respiratory: negative for  cough and shortness of air  Cardiovascular:  Negative for chest pain or  palpitations  Gastrointestinal:  negative for  See HPI  Genitourinary:  negative for  blood in urine and painful urination  Integument: negative for  rash and redness  Hematologic / Lymphatic: negative for  excessive bleeding and easy bruising  Musculoskeletal: negative for  joint pain and joint stiffness out of the ordinary  Neurological:  negative for  seizures and speech abnormality  Behavioral/Psych:  negative for  anxiety and depression out of the ordinary  Endocrine: negative for  diabetes and weight loss, unintended  Allergies / Immunologic:  negative for  anaphylaxis and rash        Objective     Vital Signs  Temp:  [97.8 °F (36.6 °C)-98.3 °F (36.8 °C)] 98.1 °F (36.7 °C)  Heart Rate:  [] 90  Resp:  [15-26] 16  BP: (116-144)/() 116/56  There is no height or weight on file to calculate BMI.  No intake or output data in the 24 hours ending 08/10/22 1122  No intake/output data recorded.       Physical Exam:   General: patient awake, alert and cooperative   Eyes: Normal lids and lashes, no scleral icterus   Neck: supple, normal ROM   Skin: warm and dry, not jaundiced   Cardiovascular: regular rhythm and rate, no murmurs auscultated   Pulm: clear to auscultation bilaterally, regular and unlabored   Abdomen: soft, nontender, nondistended; normal bowel sounds   Rectal: deferred   Extremities: no rash or edema   Psychiatric: Normal mood and behavior;         Results Review:    I have reviewed all of the patients current test results  Results from last 7 days   Lab Units 08/10/22  0453 08/09/22  1221 08/08/22  0748   WBC 10*3/mm3 8.11 8.49 15.60*   HEMOGLOBIN g/dL 7.2* 8.4* 9.3*   HEMATOCRIT % 22.7* 26.3* 29.7*   PLATELETS 10*3/mm3 186 206 216       Results from last 7 days   Lab Units 08/10/22  0453 08/09/22  1221 08/08/22  1428 08/07/22  0633   SODIUM mmol/L 144 147* 145 142   POTASSIUM mmol/L 3.3* 3.7 4.0 4.7   CHLORIDE mmol/L 110* 112* 109* 109*   CO2 mmol/L 28.0 26.0 21.0* 20.0*   BUN mg/dL 49* 74*  95* 96*   CREATININE mg/dL 1.15 1.47* 1.91* 1.69*   CALCIUM mg/dL 8.5* 9.1 9.2 9.5   BILIRUBIN mg/dL  --   --   --  0.4   ALK PHOS U/L  --   --   --  98   ALT (SGPT) U/L  --   --   --  19   AST (SGOT) U/L  --   --   --  13   GLUCOSE mg/dL 90 123* 142* 172*             No results found for: LIPASE    Radiology:    Imaging Results (Last 24 Hours)     ** No results found for the last 24 hours. **            Assessment & Plan     Patient Active Problem List   Diagnosis Code   • GI bleed K92.2   • Renal failure N19   • Hx of atrial fibrillation, no current medication Z86.79   • Hypomagnesemia E83.42   • Failure to thrive in adult R62.7   • Lab test positive for detection of COVID-19 virus U07.1   • Gastric ulcer K25.9       Impression/Plan    Gastric ulcer per EGD 8/9/2022  Anemia  Eliquis    Case reviewed with patient's nurse for today, no signs of GI blood loss, no bowel movement.  He is on twice daily IV Protonix.  He will need repeat EGD in 3 months post discharge.  Okay to advance diet as patient tolerates       Electronically signed by LISSETTE Shankar, 08/10/22, 11:14 AM CDT.       LISSETTE Shankar  08/10/22  11:22 CDT

## 2022-08-10 NOTE — THERAPY DISCHARGE NOTE
Acute Care - Speech Language Pathology Treatment Note/Discharge  [facility]     Patient Name: Bayron Coleman  : 1940  MRN: [id number]  Today's Date: 8/10/2022               Admit Date: 2022  Pt's wife is at bedside today. Pt appears much more alert and in better spirits compared to SLP initial assessment. Wife reports pt appears to be at baseline cognitively. SLP will sign off.   Rehana Lazcano CCC-SLP 8/10/2022 09:31 CDT     Visit Dx:    ICD-10-CM ICD-9-CM   1. Impaired cognition  R41.89 294.9   2. Impaired mobility  Z74.09 799.89   3. Impaired mobility and ADLs  Z74.09 V49.89    Z78.9    4. Failure to thrive in adult  R62.7 783.7   5. Gastrointestinal hemorrhage, unspecified gastrointestinal hemorrhage type  K92.2 578.9     Patient Active Problem List   Diagnosis   • GI bleed   • Renal failure   • Hx of atrial fibrillation, no current medication   • Hypomagnesemia   • Failure to thrive in adult   • Lab test positive for detection of COVID-19 virus   • Gastric ulcer     Past Medical History:   Diagnosis Date   • Adjustment disorder with withdrawal    • Cardiomyopathy (HCC)    • Carpal tunnel syndrome    • Cerebral infarction (HCC)    • COVID-19    • Dementia (HCC)    • Elevated PSA    • Elevated PSA    • Hyperlipidemia    • Hypertension    • Major depressive disorder    • Osteoarthritis    • Paroxysmal atrial fibrillation (HCC)    • Vitiligo    • Weakness      History reviewed. No pertinent surgical history.    SLP Recommendation and Plan           Anticipated Discharge Disposition (SLP): home with 24/7 care, skilled nursing facility (08/10/22 0929)                    Reason for Discharge: other (see comments) (Back to baseline) (08/10/22 0929)     Treatment Assessment (SLP): Discharge, baseline per wife (08/10/22 0918)  Plan for Continued Treatment (SLP): other (see comments) (treatment no longer indicated as pt at baseline) (08/10/22 0918)    Plan of Care Reviewed With: patient, spouse (08/10/22  0929)  Progress: improving (08/10/22 0929)  Outcome Evaluation: See note (08/10/22 0929)    SLP EVALUATION (last 72 hours)     SLP SLC Evaluation     Row Name 08/10/22 0918 08/08/22 0906                Communication Assessment/Intervention    Document Type therapy note (daily note)  -MB evaluation  -MB       Subjective Information no complaints  -MB complains of;fatigue  -MB       Patient Observations alert;cooperative  -MB lethargic  -MB       Patient/Family/Caregiver Comments/Observations Wife present  -MB No family present  -MB                General Information    Patient Profile Reviewed -- yes  -MB       Pertinent History Of Current Problem -- GI bleed, recent COVID, mild hypotension. Hx a-fib, CVA, dementia.  -MB       Precautions/Limitations, Vision -- WFL;for purposes of eval  -MB       Precautions/Limitations, Hearing -- WFL;for purposes of eval  -MB       Patient Level of Education -- Unknown  -MB       Prior Level of Function-Communication -- cognitive-linguistic impairment  -MB       Plans/Goals Discussed with -- patient  -MB       Barriers to Rehab -- cognitive status  -MB       Patient's Goals for Discharge -- return to home  -MB       Standardized Assessment Used -- SLUMS  -MB                Pain    Additional Documentation Pain Scale: FACES Pre/Post-Treatment (Group)  -MB Pain Scale: FACES Pre/Post-Treatment (Group)  -MB                Pain Scale: FACES Pre/Post-Treatment    Pain: FACES Scale, Pretreatment 0-->no hurt  -MB 0-->no hurt  -MB                Standardized Tests    Cognitive/Memory Tests -- SLUMS: [facility] Mental Status Examination  -MB                SLUMS: [facility] Mental Status Examination    SLUMS Score -- 4  -MB       SLUMS Range -- 1-19: Dementia (Less than High school edcuation)  -MB                SLP Evaluation Clinical Impressions    SLP Diagnosis -- Impaired cognition  -MB       Rehab Potential/Prognosis -- guarded  -MB       SLC Criteria for Skilled  Therapy Interventions Met -- yes;other (see comments)  Baseline currently unknown  -MB       Functional Impact -- functional impact in social situations;functional impact in ADLs;unable to make medical decisions;unable to care for self;needs 24 hour supervision;decreased ability to respond to situations safely;Poor Judgement  -MB                SLP Treatment Clinical Impressions    Treatment Assessment (SLP) Discharge, baseline per wife  -MB --       Barriers to Overall Progress (SLP) Baseline deficits  -MB --       Plan for Continued Treatment (SLP) other (see comments)  treatment no longer indicated as pt at baseline  -MB --                Recommendations    Therapy Frequency (SLP SLC) -- PRN  -MB       Predicted Duration Therapy Intervention (Days) -- until discharge  -MB       Anticipated Discharge Disposition (SLP) -- home with 24/7 care;skilled nursing facility  -MB                Communication Treatment Objective and Progress Goals (SLP)    Cognitive Linguistic Treatment Objectives -- Cognitive Linguistic Treatment Objectives (Group)  -MB                Cognitive Linguistic Treatment Objectives    Attention Selection -- attention, SLP goal 1  -MB       Orientation Selection -- orientation, SLP goal 1  -MB       Memory Skills Selection -- memory skills, SLP goal 1  -MB                Attention Goal 1 (SLP)    Improve Attention by Goal 1 (SLP) attending to task;complete sustained attention task;80%;with minimal cues (75-90%)  -MB attending to task;complete sustained attention task;80%;with minimal cues (75-90%)  -MB       Time Frame (Attention Goal 1, SLP) short term goal (STG);by discharge  -MB short term goal (STG);by discharge  -MB       Progress/Outcomes (Attention Goal 1, SLP) goal no longer appropriate  -MB --                Orientation Goal 1 (SLP)    Improve Orientation Through Goal 1 (SLP) demonstrating orientation to year;80%;with minimal cues (75-90%)  -MB demonstrating orientation to year;80%;with  minimal cues (75-90%)  -MB       Time Frame (Orientation Goal 1, SLP) short term goal (STG);by discharge  -MB short term goal (STG);by discharge  -MB       Progress/Outcomes (Orientation Goal 1, SLP) goal no longer appropriate  -MB --                Memory Skills Goal 1 (SLP)    Improve Memory Skills Through Goal 1 (SLP) use external memory aid;use written schedule;use memory strategies;recall details of the day;80%;with minimal cues (75-90%)  -MB use external memory aid;use written schedule;use memory strategies;recall details of the day;80%;with minimal cues (75-90%)  -MB       Time Frame (Memory Skills Goal 1, SLP) short term goal (STG);by discharge  -MB short term goal (STG);by discharge  -MB       Progress/Outcomes (Memory Skills Goal 1, SLP) goal no longer appropriate  -MB --             User Key  (r) = Recorded By, (t) = Taken By, (c) = Cosigned By    Initials Name Effective Dates    Rehana Catherine, CCC-SLP 06/16/21 -                    EDUCATION  The patient has been educated in the following areas:   Cognitive Impairment.           SLP GOALS     Row Name 08/10/22 0918 08/08/22 0906          Attention Goal 1 (SLP)    Improve Attention by Goal 1 (SLP) attending to task;complete sustained attention task;80%;with minimal cues (75-90%)  -MB attending to task;complete sustained attention task;80%;with minimal cues (75-90%)  -MB     Time Frame (Attention Goal 1, SLP) short term goal (STG);by discharge  -MB short term goal (STG);by discharge  -MB     Progress/Outcomes (Attention Goal 1, SLP) goal no longer appropriate  -MB --            Orientation Goal 1 (SLP)    Improve Orientation Through Goal 1 (SLP) demonstrating orientation to year;80%;with minimal cues (75-90%)  -MB demonstrating orientation to year;80%;with minimal cues (75-90%)  -MB     Time Frame (Orientation Goal 1, SLP) short term goal (STG);by discharge  -MB short term goal (STG);by discharge  -MB     Progress/Outcomes (Orientation Goal 1, SLP)  goal no longer appropriate  -MB --            Memory Skills Goal 1 (SLP)    Improve Memory Skills Through Goal 1 (SLP) use external memory aid;use written schedule;use memory strategies;recall details of the day;80%;with minimal cues (75-90%)  -MB use external memory aid;use written schedule;use memory strategies;recall details of the day;80%;with minimal cues (75-90%)  -MB     Time Frame (Memory Skills Goal 1, SLP) short term goal (STG);by discharge  -MB short term goal (STG);by discharge  -MB     Progress/Outcomes (Memory Skills Goal 1, SLP) goal no longer appropriate  -MB --           User Key  (r) = Recorded By, (t) = Taken By, (c) = Cosigned By    Initials Name Provider Type    Rehana Catherine CCC-SLP Speech and Language Pathologist                    Time Calculation:    Time Calculation- SLP     Row Name 08/10/22 0930             Time Calculation- SLP    SLP Start Time 0918  -MB      SLP Stop Time 0931  -MB      SLP Time Calculation (min) 13 min  -MB      SLP Received On 08/10/22  -MB              Timed Charges    07584-FU Selfcare Mgmt Minutes 13  -MB              Total Minutes    Timed Charges Total Minutes 13  -MB       Total Minutes 13  -MB            User Key  (r) = Recorded By, (t) = Taken By, (c) = Cosigned By    Initials Name Provider Type    Rehana Catherine CCC-SLP Speech and Language Pathologist                Therapy Charges for Today     Code Description Service Date Service Provider Modifiers Qty    16537047746  ST SELF CARE/MGMT/TRAIN EA 15 MIN 8/10/2022 Rehana Lazcano CCC-SLP GN 1                   SLP Discharge Summary  Anticipated Discharge Disposition (SLP): home with 24/7 care, skilled nursing facility  Reason for Discharge: other (see comments) (Back to baseline)  Progress Toward Achieving Short/long Term Goals: other (see comments) (Back to baseline)  Discharge Destination: other (see comments) (Still in acute care)    ABHI Odell  8/10/2022

## 2022-08-10 NOTE — THERAPY TREATMENT NOTE
Acute Care - Physical Therapy Treatment Note  [facility]     Patient Name: Bayron Coleman  : 1940  MRN: [id number]  Today's Date: 8/10/2022      Visit Dx:     ICD-10-CM ICD-9-CM   1. Impaired cognition  R41.89 294.9   2. Impaired mobility  Z74.09 799.89   3. Impaired mobility and ADLs  Z74.09 V49.89    Z78.9    4. Failure to thrive in adult  R62.7 783.7   5. Gastrointestinal hemorrhage, unspecified gastrointestinal hemorrhage type  K92.2 578.9     Patient Active Problem List   Diagnosis   • GI bleed   • Renal failure   • Hx of atrial fibrillation, no current medication   • Hypomagnesemia   • Failure to thrive in adult   • Lab test positive for detection of COVID-19 virus   • Gastric ulcer     Past Medical History:   Diagnosis Date   • Adjustment disorder with withdrawal    • Cardiomyopathy (HCC)    • Carpal tunnel syndrome    • Cerebral infarction (HCC)    • COVID-19    • Dementia (HCC)    • Elevated PSA    • Elevated PSA    • Hyperlipidemia    • Hypertension    • Major depressive disorder    • Osteoarthritis    • Paroxysmal atrial fibrillation (HCC)    • Vitiligo    • Weakness      History reviewed. No pertinent surgical history.  PT Assessment (last 12 hours)     PT Evaluation and Treatment     Row Name 08/10/22 0835          Physical Therapy Time and Intention    Subjective Information complains of;fatigue  -LY     Document Type therapy note (daily note)  -LY     Mode of Treatment physical therapy  -LY     Row Name 08/10/22 0835          General Information    Existing Precautions/Restrictions fall  -LY     Row Name 08/10/22 0835          Pain    Pretreatment Pain Rating 0/10 - no pain  -LY     Posttreatment Pain Rating 0/10 - no pain  -LY     Pain Intervention(s) Medication (See MAR)  -LY     Row Name 08/10/22 0835          Bed Mobility    Supine-Sit [place] (Bed Mobility) verbal cues;contact guard  -LY     Assistive Device (Bed Mobility) bed rails;head of bed elevated  -LY     Row Name 08/10/22 0835           Transfers    Transfers sit-stand transfer;stand-sit transfer;bed-chair transfer  -LY     Bed-Chair [place] (Transfers) verbal cues;minimum assist (75% patient effort)  -LY     Assistive Device (Bed-Chair Transfers) walker, front-wheeled  -LY     Sit-Stand [place] (Transfers) verbal cues;minimum assist (75% patient effort)  -LY     Stand-Sit [place] (Transfers) verbal cues;contact guard  -LY     Row Name 08/10/22 0835          Bed-Chair Transfer    Comment, (Bed-Chair Transfer) assist with RWX management  -LY     Row Name 08/10/22 0835          Sit-Stand Transfer    Assistive Device (Sit-Stand Transfers) walker, front-wheeled  -LY     Comment, (Sit-Stand Transfer) x5 reps total  -LY     Row Name 08/10/22 0835          Stand-Sit Transfer    Assistive Device (Stand-Sit Transfers) walker, front-wheeled  -LY     Row Name 08/10/22 0835          Gait/Stairs (Locomotion)    [place] Level (Gait) verbal cues;minimum assist (75% patient effort)  -LY     Assistive Device (Gait) walker, front-wheeled  -LY     Distance in Feet (Gait) 5 steps to chair  -LY     Row Name 08/10/22 0835          Motor Skills    Comments, Therapeutic Exercise BLE AROM x20 reps seated  -LY     Row Name 08/10/22 0835          Plan of Care Review    Plan of Care Reviewed With patient  -LY     Progress improving  -LY     Outcome Evaluation PT tx completed. Pt in very good spirits this AM. CGA for supine to sit with HOB elevated and increased time to complete. Performed BLE AROM x20 reps seated. Min x1 to take 5 steps to chair d/t assist with RWX management. Able to stand  with RWX and min x1 a total of 5 reps. Pt very fatigued after last stand and deferred further activity. Will cont to follow.  -LY     Row Name 08/10/22 0835          Positioning and Restraints    Pre-Treatment Position in bed  -LY     Post Treatment Position chair  -LY     In Chair sitting;notified nsg;call light within reach;encouraged to call for assist;exit  alarm on  -LY           User Key  (r) = Recorded By, (t) = Taken By, (c) = Cosigned By    Initials Name Provider Type    LY Mary Dillon, PTA Physical Therapist Assistant                Physical Therapy Education                 Title: PT OT SLP Therapies (In Progress)     Topic: Physical Therapy (In Progress)     Point: Mobility training (Done)     Learning Progress Summary           Patient Acceptance, E, NR,VU by SC at 8/8/2022 0737    Comment: Pt was educated on the benefits of getting up out of bed and moving.                   Point: Home exercise program (Not Started)     Learner Progress:  Not documented in this visit.          Point: Body mechanics (Not Started)     Learner Progress:  Not documented in this visit.          Point: Precautions (Not Started)     Learner Progress:  Not documented in this visit.                      User Key     Initials Effective Dates Name Provider Type Discipline    SC 07/18/22 -  Fe Hayes, PT Student PT Student PT              PT Recommendation and Plan     Plan of Care Reviewed With: patient  Progress: improving  Outcome Evaluation: PT tx completed. Pt in very good spirits this AM. CGA for supine to sit with HOB elevated and increased time to complete. Performed BLE AROM x20 reps seated. Min x1 to take 5 steps to chair d/t assist with RWX management. Able to stand  with RWX and min x1 a total of 5 reps. Pt very fatigued after last stand and deferred further activity. Will cont to follow.   Outcome Measures     Row Name 08/10/22 0835 08/09/22 3347          How much help from another person do you currently need...    Turning from your back to your side while in flat bed without using bedrails? 3  -LY 3  -LY     Moving from lying on back to sitting on the side of a flat bed without bedrails? 3  -LY 3  -LY     Moving to and from a bed to a chair (including a wheelchair)? 3  -LY 2  -LY     Standing up from a chair using your arms (e.g., wheelchair, bedside chair)? 3   -LY 2  -LY     Climbing 3-5 steps with a railing? 2  -LY 2  -LY     To walk in hospital room? 3  -LY 2  -LY     AM-PAC 6 Clicks Score (PT) 17  -LY 14  -LY            Functional Assessment    Outcome Measure Options AM-PAC 6 Clicks Basic Mobility (PT)  -LY AM-PAC 6 Clicks Basic Mobility (PT)  -LY           User Key  (r) = Recorded By, (t) = Taken By, (c) = Cosigned By    Initials Name Provider Type    Mary Galindo PTA Physical Therapist Assistant                 Time Calculation:    PT Charges     Row Name 08/10/22 0911             Time Calculation    Start Time 0835  -LY      Stop Time 0902  -LY      Time Calculation (min) 27 min  -LY      PT Received On 08/10/22  -LY              Time Calculation- PT    Total Timed Code Minutes- PT 27 minute(s)  -LY              Timed Charges    58134 - PT Therapeutic Exercise Minutes 10  -LY      10194 - PT Therapeutic Activity Minutes 17  -LY              Total Minutes    Timed Charges Total Minutes 27  -LY       Total Minutes 27  -LY            User Key  (r) = Recorded By, (t) = Taken By, (c) = Cosigned By    Initials Name Provider Type    Mary Galindo PTA Physical Therapist Assistant              Therapy Charges for Today     Code Description Service Date Service Provider Modifiers Qty    45816048381 HC PT THERAPEUTIC ACT EA 15 MIN 8/9/2022 Mary Dillon PTA GP 2    06488381727 HC PT THER PROC EA 15 MIN 8/10/2022 Mray Dillon PTA GP 1    54392996921 HC PT THERAPEUTIC ACT EA 15 MIN 8/10/2022 Mary Dillon PTA GP 1          PT G-Codes  Outcome Measure Options: AM-PAC 6 Clicks Basic Mobility (PT)  AM-PAC 6 Clicks Score (PT): 17  AM-PAC 6 Clicks Score (OT): 11    Mary Dillon PTA  8/10/2022

## 2022-08-10 NOTE — PLAN OF CARE
Goal Outcome Evaluation:              Outcome Evaluation: NTN follow up. Ate 100% of clear liquid breakfast, denied N/V, abd discomfort. Pt and pt spouse stated nearly a week without PO intake. Anticipate diet advancement per MD/team recommendations. NTN following per protocol.

## 2022-08-10 NOTE — PROGRESS NOTES
[facility] Palliative Care Services  Progress Note  Patient Name: Bayron Coleman  Date of Admission: 8/7/2022  Today's Date: 08/10/22     Code Status and Medical Interventions:   Ordered at: 08/08/22 1041     Medical Intervention Limits:    NO intubation (DNI)     Level Of Support Discussed With:    Next of Kin (If No Surrogate)     Code Status (Patient has no pulse and is not breathing):    No CPR (Do Not Attempt to Resuscitate)     Medical Interventions (Patient has pulse or is breathing):    Limited Support     Comments:    Spouse - Flower     Subjective   Chief complaint/Reason for Referral/Visit: Follow up on Goals of Care/Advance Care Planning.    Medical record reviewed. Events noted. Renal function continues to improve based upon labs collected this morning. Creatinine is now 1.15 (1.47 yesterday), BUN 49 (74 yesterday) and GFR 63.5 (47.3 yesterday). Mild hypokalemia and hypocalcemia. Hemoglobin continues to trend downward and is now 7.2 with hematocrit 22.7 although he has been receiving IV fluids for several days. He is alert and oriented to self and knows he is in a hospital however unsure of city, situation or time. Working with OT at time of exam and sitting up in chair. Does not appear in any distress. Spouse present at bedside.     Advance Care Planning   Advanced Directives: Spouse reports he filled out advance directive in past however never officially completed.      Advance Care Planning Discussion: Discussed treatment and discharge with spouse, Flower, as Mr. Coleman is unable to demonstrate ability to make complex medical decisions at this time. Spoke to Flower regarding goals of care further as well as medical priorities. She expressed he was hopeful she is able to take him home at discharge and in no therapy is recommending rehabilitation.  Reflected on past poor experiences with rehabilitation placement.  Reports she and her family will be able to assist him in addition to therapist  "that come to their home twice a week. Flower shared she is aware he is not ambulating well however this is his baseline and stated, \"I know he is not going to be running marathons or even improve greatly however I would be happy if he was able to continue assisting me with getting out of bed to the chair and ambulating some in the house.\" Support provided. Had questions regarding when he would be discharged, have passed along to attending. Discussed MOST form and uses. Explored whether she would be interested in completing MOST form to reflect his healthcare wishes. Expressed interest in completing during hospitalization. Have initiated and notified attending of needed signature for completion.     The patient receives support from his spouse and extended family. His spouse is his next of kin.  Due to the palliative care, treatment options, discharge options and medical priorities we will establish an advance care plan.    Goals of care: Ongoing.    Review of Systems   Unable to perform ROS: dementia     Pain Assessment  CPOT and PAINAD Scales: PAINAD (Pain Assessment in Advance Dementia Scale)  CPOT Facial Expression: 0-->relaxed, neutral  CPOT Body Movements: 0-->absence of movements  CPOT Muscle Tension: 0-->relaxed  Ventilator Compliance/Vocalization: 0-->tolerating ventilator or movement  CPOT Score: 0  PAINAD Breathin-->normal  PAINAD Negative Vocalization: 0-->none  PAINAD Facial Expression: 0-->smiling or inexpressive  PAINAD Body Language: 0-->relaxed  PAINAD Consolability: 0-->no need to console  PAINAD Score: 0  Objective   Diagnostics: Reviewed    No intake or output data in the 24 hours ending 08/10/22 1124  Current Facility-Administered Medications   Medication Dose Route Frequency Provider Last Rate Last Admin   • acetaminophen (TYLENOL) suppository 650 mg  650 mg Rectal Q4H PRN Radha Sal, DO       • atorvastatin (LIPITOR) tablet 40 mg  40 mg Oral Nightly Chepe Germain MD   40 mg " at 08/09/22 2044   • donepezil (ARICEPT) tablet 10 mg  10 mg Oral Nightly Chepe Germain MD   10 mg at 08/09/22 2044   • escitalopram (LEXAPRO) tablet 10 mg  10 mg Oral Daily Chepe Germain MD   10 mg at 08/09/22 1552   • lactated ringers infusion  75 mL/hr Intravenous Continuous Radha Sal DO 75 mL/hr at 08/10/22 0628 75 mL/hr at 08/10/22 0628   • ondansetron (ZOFRAN) injection 4 mg  4 mg Intravenous Q6H PRN Radha Sal DO       • pantoprazole (PROTONIX) injection 40 mg  40 mg Intravenous Q12H Kevin Hernandez MD   40 mg at 08/10/22 0928   • sodium chloride 0.9 % flush 10 mL  10 mL Intravenous Q12H Radha Sal DO   10 mL at 08/10/22 0927   • sodium chloride 0.9 % flush 10 mL  10 mL Intravenous PRN Radha Sal DO       • sodium chloride 0.9 % infusion  100 mL/hr Intravenous Continuous Aisha Tomas MD   Stopped at 08/09/22 1448     lactated ringers, 75 mL/hr, Last Rate: 75 mL/hr (08/10/22 0628)  sodium chloride, 100 mL/hr, Last Rate: Stopped (08/09/22 1448)      •  acetaminophen  •  ondansetron  •  sodium chloride    Assessment:  Vital Signs: /56 (BP Location: Right arm, Patient Position: Lying)   Pulse 90   Temp 98.1 °F (36.7 °C) (Oral)   Resp 16   Wt 117 kg (258 lb 11.2 oz)   SpO2 91%     Physical Exam  Vitals and nursing note reviewed.   Constitutional:       General: He is not in acute distress.     Appearance: He is obese.   HENT:      Head: Normocephalic and atraumatic.      Mouth/Throat:      Mouth: Mucous membranes are dry.   Eyes:      General: Lids are normal.      Extraocular Movements: Extraocular movements intact.   Neck:      Vascular: No JVD.      Trachea: Trachea normal.   Cardiovascular:      Rate and Rhythm: Normal rate. Rhythm irregular.      Heart sounds: Normal heart sounds.   Pulmonary:      Effort: Pulmonary effort is normal.      Breath sounds: No wheezing, rhonchi or rales.   Chest:      Chest wall: No swelling or  tenderness.   Abdominal:      General: Abdomen is protuberant. There is no distension.      Tenderness: There is no abdominal tenderness.   Genitourinary:     Comments: Incontinent of urine per spouse.  Musculoskeletal:      Cervical back: Neck supple.   Skin:     General: Skin is warm and dry.      Coloration: Skin is pale.   Neurological:      Mental Status: He is alert. He is disoriented.      Motor: Weakness present.   Psychiatric:         Mood and Affect: Mood normal.         Behavior: Behavior is cooperative.         Cognition and Memory: Cognition is impaired.     Functional status: Palliative Performance Scale Score: Performance 50% based on the following measures: Ambulation: Mainly sit or lie down, Activity and Evidence of Disease: Unable to do any work, extensive evidence of disease, Self-Care: Considerable assistance required,  Intake: Normal or reduced, LOC: Full or confusion.  Nutritional status: Albumin 3.10. There is no height or weight on file to calculate BMI.  Patient status: Disease state: Controlled with current treatments.    Impression/Problem List:  1. Dementia (FAST 7C) / Impaired cognition   2.   Impaired mobility and activities of daily living   3.   GI bleed due to gastric ulcer   4.   Renal insufficiency - Improving   5.   Anemia - Iron deficiency   6.   Hypokalemia   7.   Hypoalbuminemia  8.   Atrial fibrillation  9.   Advanced age  10. History of COVID-19    Plans/Recommendations     1. Goals of care include CODE STATUS NO CPR with limited support interventions.    2. Palliative care encounter  - Prognosis is poor to guarded long term secondary to dementia (FAST 7C), impaired mobility and ADLs, GI bleed related to gastric ulcer, renal insufficiency, iron deficiency anemia, hypoalbuminemia, atrial fibrillation, advanced age and other comorbidities listed above.    - Explored goals of care and medical priorities further with spouse, Flower today at bedside as Mr. Coleman is unable to  demonstrate ability to make complex medical decisions at this time.    - Shared she is hopeful he is able to return home with assistance from family and home health services at discharge.     - Discussed MOST form and spouse expressed interest in completing. Explained MOST is used in [place] and provided education regarding completing a POLST in [place].    - During discussion Flower expressed Mr. Coleman would not wish to receive artificial nutrition or undergo feeding tube placement.  CODE STATUS changed to reflect.     - Initiated MOST to reflect healthcare wishes and notified attending of needed signature for completion.      - Thank you for allowing us to participate in patient's plan of care. Palliative Care Team will continue to follow patient.     Time spent: 55 minutes spent reviewing medical and medication records, assessing and examining patient, discussing with family, answering questions, providing some guidance about a plan and documentation of care, and coordinating care with other healthcare members, with > 50% time spent face to face.   20 minutes spent on advance care planning.     Electronically signed by, LISSETTE Castellanos, 08/10/22, 12:05 CDT.

## 2022-08-11 NOTE — PAYOR COMM NOTE
REF:[id number]     [facility]  [place],  [phone]  OR   FAX   [phone]      Savage Gomez (81 y.o. Male)             Date of Birth   1940    Social Security Number       Address   [address] [place] [address]    Home Phone   [phone]    MRN   [id number]       Baptism   Anabaptism    Marital Status                               Admission Date   8/7/22    Admission Type   Urgent    Admitting Provider   Chepe Germain MD    Attending Provider       Department, Room/Bed   [facility] 3C, 388/1       Discharge Date   8/10/2022    Discharge Disposition   Home or Self Care    Discharge Destination                               Attending Provider: (none)   Allergies: Ciprofloxacin, Lisinopril, Sulfa Antibiotics, Terazosin    Isolation: None   Infection: COVID (History) (08/08/22)   Code Status: Prior   Advance Care Planning Activity    Ht: --   Wt: 117 kg (258 lb 11.2 oz)    Admission Cmt: None   Principal Problem: None                Active Insurance as of 8/7/2022     Primary Coverage     Payor Plan Insurance Group Employer/Plan Group    [facility] [place] [id number]      Payor Plan Address Payor Plan Phone Number Payor Plan Fax Number Effective Dates    [facility]UN[facility] [phone]  8/6/2022 - None Entered    [address] [address]       [facility] [address]       Subscriber Name Subscriber Birth Date Member ID       SAVAGE GOMEZ 1940 [id number]                 Emergency Contacts      (Rel.) Home Phone Work Phone Mobile Phone    JUANITO GOMEZ (Spouse) [phone] -- --    DELAY,KIRSTIE (Daughter) [phone] -- --               Discharge Summary      Chepe Germain MD at 08/10/22 1439              [facility] Medicine Services  DISCHARGE SUMMARY       Date of Admission: 8/7/2022  Date of Discharge:  8/10/2022  Primary Care Physician: Provider, No Known    Presenting Problem/Chief  Complaint:  GI Bleeding    Final Discharge Diagnoses:  Active Hospital Problems    Diagnosis    • Gastric ulcer    • GI bleed    • Renal failure    • Hx of atrial fibrillation, no current medication    • Hypomagnesemia    • Failure to thrive in adult    • Lab test positive for detection of COVID-19 virus        Consults:   1.  GI  2.  Palliative Care    Procedures Performed:   1.  EGD    Pertinent Test Results:   EGD:  Impression:  - Normal gastroesophageal junction and esophagus.  - Two medium (6 - 8 mm) deep, cratered, non-bleeding gastric ulcers with no stigmata of bleeding in the  posterior antrum. Biopsied to assess for H. pylori (a bacterium frequently present in the stomach and  which may be associated with ulcers and inflammation) and precancerous changes (metaplasia,  dysplasia).  - Normal examined duodenum.  - The examined portions of the nasopharynx, oropharynx and larynx were normal.  - The examination was otherwise normal.      Imaging Results (All)     Procedure Component Value Units Date/Time    XR Chest 1 View [[id number]] Collected: 08/07/22 1404     Updated: 08/07/22 1408    Narrative:      EXAMINATION: XR CHEST 1 VW- 8/7/2022 2:04 PM CDT     HISTORY: covid positive.     REPORT: A frontal view of the chest was obtained.     COMPARISON: There are no correlative imaging studies for comparison.     Lungs are moderately hypoaerated, no infiltrate is identified. There is  borderline cardiomegaly and no evidence of CHF. No pneumothorax or  pleural effusion is identified. The osseous structures and upper abdomen  appear unremarkable.       Impression:      Pulmonary hypoventilation, no acute infiltrates.  This report was finalized on 08/07/2022 14:05 by Dr. Reuben Ridley MD.          LAB RESULTS:      Lab 08/10/22  1208 08/10/22  0453 08/09/22  1221 08/08/22  0748 08/07/22  2145 08/07/22  1523 08/07/22  1322 08/07/22  0914 08/07/22  0633   WBC  --  8.11 8.49 15.60*  --   --   --   --  17.02*   HEMOGLOBIN  7.8* 7.2* 8.4* 9.3*  --   --   --   --  11.9*   HEMATOCRIT 26.0* 22.7* 26.3* 29.7*  --   --   --   --  35.2*   PLATELETS  --  186 206 216  --   --   --   --  215   NEUTROS ABS  --   --   --  14.02*  --   --   --   --  15.20*   IMMATURE GRANS (ABS)  --   --   --  0.14*  --   --   --   --  0.15*   LYMPHS ABS  --   --   --  0.76  --   --   --   --  0.86   MONOS ABS  --   --   --  0.63  --   --   --   --  0.76   EOS ABS  --   --   --  0.02  --   --   --   --  0.01   MCV  --  97.0 97.0 97.4*  --   --   --   --  91.9   LACTATE  --   --   --   --  3.8* 2.6* 2.9* 2.4* 2.5*         Lab 08/10/22  0453 08/09/22  1221 08/08/22  1428 08/08/22  0748 08/07/22  0633   SODIUM 144 147* 145  --  142   POTASSIUM 3.3* 3.7 4.0  --  4.7   CHLORIDE 110* 112* 109*  --  109*   CO2 28.0 26.0 21.0*  --  20.0*   ANION GAP 6.0 9.0 15.0  --  13.0   BUN 49* 74* 95*  --  96*   CREATININE 1.15 1.47* 1.91*  --  1.69*   EGFR 63.5 47.3* 34.6*  --  40.0*   GLUCOSE 90 123* 142*  --  172*   CALCIUM 8.5* 9.1 9.2  --  9.5   MAGNESIUM  --   --   --  1.8 1.5*   PHOSPHORUS  --   --   --   --  3.6   HEMOGLOBIN A1C  --   --   --  6.30*  --    TSH  --   --   --  2.480  --          Lab 08/07/22  0633   TOTAL PROTEIN 6.2   ALBUMIN 3.10*   GLOBULIN 3.1   ALT (SGPT) 19   AST (SGOT) 13   BILIRUBIN 0.4   ALK PHOS 98         Lab 08/07/22  0633   TROPONIN T 0.080*         Lab 08/08/22  0748   CHOLESTEROL 88   LDL CHOL 41   HDL CHOL 29*   TRIGLYCERIDES 88         Lab 08/07/22  0633   IRON 114   IRON SATURATION 44   TIBC 259*   TRANSFERRIN 174*         Brief Urine Lab Results  (Last result in the past 365 days)      Color   Clarity   Blood   Leuk Est   Nitrite   Protein   CREAT   Urine HCG        08/07/22 1449 Dark Yellow   Clear   Negative   Small (1+)   Negative   Negative               Microbiology Results (last 10 days)     Procedure Component Value - Date/Time    Blood Culture - Blood, Hand, Right [[id number]]  (Normal) Collected: 08/07/22 1523    Lab Status:  "Preliminary result Specimen: Blood from Hand, Right Updated: 08/09/22 1545     Blood Culture No growth at 2 days    Blood Culture - Blood, Hand, Left [[id number]]  (Normal) Collected: 08/07/22 1523    Lab Status: Preliminary result Specimen: Blood from Hand, Left Updated: 08/09/22 1545     Blood Culture No growth at 2 days    Respiratory Panel PCR w/COVID-19(SARS-CoV-2) GENA/CHRISTIANO/ROSALIA/PAD/COR/MAD/NORBERTO In-House, NP Swab in UTM/VTM, 3-4 HR TAT - Swab, Nasopharynx [[id number]]  (Normal) Collected: 08/07/22 1439    Lab Status: Final result Specimen: Swab from Nasopharynx Updated: 08/07/22 1600     ADENOVIRUS, PCR Not Detected     Coronavirus 229E Not Detected     Coronavirus HKU1 Not Detected     Coronavirus NL63 Not Detected     Coronavirus OC43 Not Detected     COVID19 Not Detected     Human Metapneumovirus Not Detected     Human Rhinovirus/Enterovirus Not Detected     Influenza A PCR Not Detected     Influenza B PCR Not Detected     Parainfluenza Virus 1 Not Detected     Parainfluenza Virus 2 Not Detected     Parainfluenza Virus 3 Not Detected     Parainfluenza Virus 4 Not Detected     RSV, PCR Not Detected     Bordetella pertussis pcr Not Detected     Bordetella parapertussis PCR Not Detected     Chlamydophila pneumoniae PCR Not Detected     Mycoplasma pneumo by PCR Not Detected    Narrative:      In the setting of a positive respiratory panel with a viral infection PLUS a negative procalcitonin without other underlying concern for bacterial infection, consider observing off antibiotics or discontinuation of antibiotics and continue supportive care. If the respiratory panel is positive for atypical bacterial infection (Bordetella pertussis, Chlamydophila pneumoniae, or Mycoplasma pneumoniae), consider antibiotic de-escalation to target atypical bacterial infection.          Chief Complaint on Day of Discharge: \"I want to go home.\"    History of Present Illness on Day of Discharge:   Doing ok, no black or bloody stools.  " Wants to go home.     Hospital Course:  The patient is a 81 y.o. male who presented to [facility] with GI Bleeding.  He was admitted to the hospital.  GI was consulted.  He was placed on IV protonix.      He underwent EGD which showed the results above.      GI recommends he continue on a BID PPI.  They also recommend he start Carafate.  We will also add iron tablets.      His hgb is down slightly.  It was as low as 7.2 this AM, but on recheck it's 7.8.  I offered that we could watch him another night, but he and his wife prefer to go home.  They understand to go to the ER should he develop black or bloody stools.     Condition on Discharge:  stable    Physical Exam on Discharge:  /56 (BP Location: Right arm, Patient Position: Lying)   Pulse 90   Temp 98.1 °F (36.7 °C) (Oral)   Resp 16   Wt 117 kg (258 lb 11.2 oz)   SpO2 91%   Physical Exam  Vitals reviewed.   Constitutional:       Appearance: He is well-developed.   HENT:      Head: Normocephalic and atraumatic.      Right Ear: External ear normal.      Left Ear: External ear normal.      Nose: Nose normal.   Eyes:      General: No scleral icterus.        Right eye: No discharge.         Left eye: No discharge.      Conjunctiva/sclera: Conjunctivae normal.      Pupils: Pupils are equal, round, and reactive to light.   Neck:      Thyroid: No thyromegaly.      Trachea: No tracheal deviation.   Cardiovascular:      Rate and Rhythm: Normal rate and regular rhythm.      Heart sounds: Normal heart sounds. No murmur heard.    No friction rub. No gallop.   Pulmonary:      Effort: Pulmonary effort is normal. No respiratory distress.      Breath sounds: Normal breath sounds. No stridor. No wheezing or rales.   Chest:      Chest wall: No tenderness.   Abdominal:      General: Bowel sounds are normal. There is no distension.      Palpations: Abdomen is soft. There is no mass.      Tenderness: There is no abdominal tenderness. There is no guarding or  rebound.      Hernia: No hernia is present.   Musculoskeletal:         General: No deformity. Normal range of motion.      Cervical back: Normal range of motion and neck supple.   Lymphadenopathy:      Cervical: No cervical adenopathy.   Skin:     General: Skin is warm and dry.      Coloration: Skin is not pale.      Findings: No erythema or rash.   Neurological:      Mental Status: He is alert and oriented to person, place, and time.      Cranial Nerves: No cranial nerve deficit.      Motor: No abnormal muscle tone.      Coordination: Coordination normal.      Deep Tendon Reflexes: Reflexes are normal and symmetric. Reflexes normal.   Psychiatric:         Behavior: Behavior normal.         Thought Content: Thought content normal.         Judgment: Judgment normal.           Discharge Disposition:  Home with home health  Home or Self Care    Discharge Medications:     Discharge Medications      New Medications      Instructions Start Date   ferrous sulfate 300 (60 Fe) MG/5ML syrup   300 mg, Oral, 2 Times Daily      pantoprazole 40 MG EC tablet  Commonly known as: Protonix   40 mg, Oral, Daily      sucralfate 1 g tablet  Commonly known as: Carafate   1 g, Oral, 4 Times Daily         Continue These Medications      Instructions Start Date   apixaban 5 MG tablet tablet  Commonly known as: ELIQUIS   5 mg, Oral, 2 Times Daily      aspirin 81 MG EC tablet   81 mg, Oral, Daily      atorvastatin 80 MG tablet  Commonly known as: LIPITOR   40 mg, Oral, Nightly, Takes 1/2 tab daily      cholecalciferol 25 MCG (1000 UT) tablet  Commonly known as: VITAMIN D3   3,000 Units, Oral, Daily      donepezil 10 MG tablet  Commonly known as: ARICEPT   10 mg, Oral, Nightly      escitalopram 20 MG tablet  Commonly known as: LEXAPRO   10 mg, Oral, Daily, Takes 1/2 tab daily      losartan 100 MG tablet  Commonly known as: COZAAR   100 mg, Oral, Daily      metoprolol succinate  MG 24 hr tablet  Commonly known as: TOPROL-XL   50 mg, Oral,  Daily, Takes 1/2 tab daily      potassium chloride ER 20 MEQ tablet controlled-release ER tablet  Commonly known as: K-TAB   20 mEq, Oral, Daily      venlafaxine  MG 24 hr capsule  Commonly known as: EFFEXOR-XR   150 mg, Oral, Daily             Discharge Diet: regular    Activity at Discharge: as tolerated    Discharge Care Plan/Instructions:   Hold Eliquis for 2 more days  Follow up with PCP in 1 week  Follow up with GI in 2 weeks  Go to ER for black or bloody stools or vomiting blood    Follow-up Appointments:   No future appointments.    Test Results Pending at Discharge: n/a    Electronically signed by Joseph Kirkland MD, 08/10/22, 14:48 CDT.    Time: 35 minutes          Electronically signed by Joseph Kirkland MD at 08/10/22 1448       Discharge Order (From admission, onward)     Start     Ordered    08/10/22 1444  Discharge patient  Once        Expected Discharge Date: 08/10/22    Discharge Disposition: Home or Self Care    Physician of Record for Attribution - Please select from Treatment Team: JOSEPH KIRKLAND [[id number]]    Review needed by CMO to determine Physician of Record: No       Question Answer Comment   Physician of Record for Attribution - Please select from Treatment Team JOSEPH KIRKLAND    Review needed by CMO to determine Physician of Record No        08/10/22 1444

## 2022-08-11 NOTE — OUTREACH NOTE
Prep Survey    Flowsheet Row Responses   Denominational facility patient discharged from? [facility]   Is LACE score < 7 ? No   Emergency Room discharge w/ pulse ox? No   Eligibility Readm Mgmt   Discharge diagnosis Gastric ulcerGI bleed   Does the patient have one of the following disease processes/diagnoses(primary or secondary)? Other   Does the patient have Home health ordered? No   Is there a DME ordered? No   Prep survey completed? Yes          ELVIN WEATHERS - Registered Nurse

## 2022-08-11 NOTE — THERAPY DISCHARGE NOTE
Acute Care - Physical Therapy Discharge Summary  [facility]       Patient Name: Bayron Coleman  : 1940  MRN: [id number]    Today's Date: 2022                 Admit Date: 2022      PT Recommendation and Plan    Visit Dx:    ICD-10-CM ICD-9-CM   1. Impaired cognition  R41.89 294.9   2. Impaired mobility  Z74.09 799.89   3. Impaired mobility and ADLs  Z74.09 V49.89    Z78.9    4. Failure to thrive in adult  R62.7 783.7   5. Gastrointestinal hemorrhage, unspecified gastrointestinal hemorrhage type  K92.2 578.9        Outcome Measures     Row Name 08/10/22 0835 22 1514          How much help from another person do you currently need...    Turning from your back to your side while in flat bed without using bedrails? 3  -LY 3  -LY     Moving from lying on back to sitting on the side of a flat bed without bedrails? 3  -LY 3  -LY     Moving to and from a bed to a chair (including a wheelchair)? 3  -LY 2  -LY     Standing up from a chair using your arms (e.g., wheelchair, bedside chair)? 3  -LY 2  -LY     Climbing 3-5 steps with a railing? 2  -LY 2  -LY     To walk in hospital room? 3  -LY 2  -LY     AM-PAC 6 Clicks Score (PT) 17  -LY 14  -LY            Functional Assessment    Outcome Measure Options AM-PAC 6 Clicks Basic Mobility (PT)  -LY AM-PAC 6 Clicks Basic Mobility (PT)  -LY           User Key  (r) = Recorded By, (t) = Taken By, (c) = Cosigned By    Initials Name Provider Type    LY Mary Dillon PTA Physical Therapist Assistant                     PT Rehab Goals     Row Name 22 1539             Bed Mobility Goal 1 (PT)    Activity/Assistive Device (Bed Mobility Goal 1, PT) bridging;rolling to left;rolling to right;sit to supine;supine to sit  -LINDA      [place] Level/Cues Needed (Bed Mobility Goal 1, PT) contact guard required  -LINDA      Time Frame (Bed Mobility Goal 1, PT) long term goal (LTG);10 days  -LINDA      Progress/Outcomes (Bed Mobility Goal 1, PT) goal not met  -LINDA               Transfer Goal 1 (PT)    Activity/Assistive Device (Transfer Goal 1, PT) sit-to-stand/stand-to-sit;bed-to-chair/chair-to-bed;walker, rolling  -LINDA      [place] Level/Cues Needed (Transfer Goal 1, PT) minimum assist (75% or more patient effort)  -LINDA      Time Frame (Transfer Goal 1, PT) long term goal (LTG);10 days  -LINDA      Progress/Outcome (Transfer Goal 1, PT) goal not met  -LINDA              Gait Training Goal 1 (PT)    Activity/Assistive Device (Gait Training Goal 1, PT) gait (walking locomotion);assistive device use;decrease fall risk;increase endurance/gait distance;increase energy conservation;walker, rolling  -LINDA      [place] Level (Gait Training Goal 1, PT) minimum assist (75% or more patient effort)  -LINDA      Distance (Gait Training Goal 1, PT) 20ft  -LINDA      Progress/Outcome (Gait Training Goal 1, PT) goal not met  -LINDA            User Key  (r) = Recorded By, (t) = Taken By, (c) = Cosigned By    Initials Name Provider Type Discipline    Yfn Maravilla PTA Physical Therapist Assistant PT                    PT Discharge Summary  Anticipated Discharge Disposition (PT): home with assist  Reason for Discharge: Discharge from facility  Outcomes Achieved: Refer to plan of care for updates on goals achieved  Discharge Destination: Home with assist      Yfn Pelletier PTA   8/11/2022

## 2022-08-12 LAB
BACTERIA SPEC AEROBE CULT: NORMAL
BACTERIA SPEC AEROBE CULT: NORMAL
CYTO UR: NORMAL
LAB AP CASE REPORT: NORMAL
Lab: NORMAL
PATH REPORT.FINAL DX SPEC: NORMAL
PATH REPORT.GROSS SPEC: NORMAL

## 2022-08-14 NOTE — THERAPY DISCHARGE NOTE
Acute Care - Occupational Therapy Discharge Summary  [facility]     Patient Name: Bayron Coleman  : 1940  MRN: [id number]    Today's Date: 2022                 Admit Date: 2022        OT Recommendation and Plan    Visit Dx:    ICD-10-CM ICD-9-CM   1. Impaired cognition  R41.89 294.9   2. Impaired mobility  Z74.09 799.89   3. Impaired mobility and ADLs  Z74.09 V49.89    Z78.9    4. Failure to thrive in adult  R62.7 783.7   5. Gastrointestinal hemorrhage, unspecified gastrointestinal hemorrhage type  K92.2 578.9                OT Rehab Goals     Row Name 22 0700             Dressing Goal 1 (OT)    Activity/Device (Dressing Goal 1, OT) upper body dressing  -CH      [place]/Cues Needed (Dressing Goal 1, OT) supervision required;set-up required;verbal cues required  -CH      Time Frame (Dressing Goal 1, OT) long term goal (LTG);by discharge  -CH      Progress/Outcome (Dressing Goal 1, OT) goal not met  -CH              Grooming Goal 1 (OT)    Activity/Device (Grooming Goal 1, OT) grooming skills, all  -CH      [place] (Grooming Goal 1, OT) supervision required;set-up required;verbal cues required  -CH      Time Frame (Grooming Goal 1, OT) long term goal (LTG);by discharge  -CH      Progress/Outcome (Grooming Goal 1, OT) goal not met  -CH              Problem Specific Goal 1 (OT)    Problem Specific Goal 1 (OT) Pt will follow 50% of 1-step commands.  -CH      Time Frame (Problem Specific Goal 1, OT) long term goal (LTG);by discharge  -CH      Progress/Outcome (Problem Specific Goal 1, OT) goal met  -CH            User Key  (r) = Recorded By, (t) = Taken By, (c) = Cosigned By    Initials Name Provider Type Discipline    CH Azucena Valenzuela, OTR/L Occupational Therapist OT                    Timed Therapy Charges  Total Units: 2    Charges  Total Units: 2    Procedure Name Documented Minutes Units Code    HC OT THERAPEUTIC ACT EA 15 MIN 16  1    53841 (CPT®)      HC OT SELF CARE/MGMT/TRAIN EA 15  MIN 10  1    75708 (CPT®)               Documented Minutes  Total Minutes: 26    Therapy Provided Minutes    83326 - OT Therapeutic Activity Minutes 16    62014 - OT Self Care/Mgmt Minutes 10                    OT Discharge Summary  Anticipated Discharge Disposition (OT): skilled nursing facility  Reason for Discharge: Discharge from facility  Outcomes Achieved: Refer to plan of care for updates on goals achieved  Discharge Destination: Home with assist      Azucena Valenzuela OTR/MORIAH  8/14/2022

## 2022-08-19 ENCOUNTER — READMISSION MANAGEMENT (OUTPATIENT)
Dept: CALL CENTER | Facility: HOSPITAL | Age: 82
End: 2022-08-19

## 2022-08-19 NOTE — OUTREACH NOTE
Medical Week 2 Survey    Flowsheet Row Responses   [facility] patient discharged from? [place]   Does the patient have one of the following disease processes/diagnoses(primary or secondary)? Other   Week 2 attempt successful? Yes   Call start time 1321   Call end time 1325   Person spoke with today (if not patient) and relationship Flower-wife.   Meds reviewed with patient/caregiver? Yes   Is the patient having any side effects they believe may be caused by any medication additions or changes? No   Does the patient have all medications ordered at discharge? Yes   Is the patient taking all medications as directed (includes completed medication regime)? Yes   Does the patient have a primary care provider?  Yes   Does the patient have an appointment with their PCP within 7 days of discharge? Yes   Has the patient kept scheduled appointments due by today? Yes   Has home health visited the patient within 72 hours of discharge? Yes   Home health comments Wife states has palliative nurses visiting.   Psychosocial issues? No   Did the patient receive a copy of their discharge instructions? Yes   Nursing interventions Reviewed instructions with patient   What is the patient's perception of their health status since discharge? Improving   Is the patient/caregiver able to teach back signs and symptoms related to disease process for when to call PCP? Yes   Is the patient/caregiver able to teach back signs and symptoms related to disease process for when to call 911? Yes   Is the patient/caregiver able to teach back the hierarchy of who to call/visit for symptoms/problems? PCP, Specialist, Home health nurse, Urgent Care, ED, 911 Yes   If the patient is a current smoker, are they able to teach back resources for cessation? Not a smoker   Week 2 Call Completed? Yes   Graduated Yes   Is the patient interested in additional calls from an ambulatory ?  NOTE:  applies to high risk patients requiring additional  follow-up. No   Wrap up additional comments Wife states patient is improving. States is having palliative care nurses visiting, HH PT. Denies any further GI bleeding or any needs today.          MARIAG UADALUPE LOUISE - Registered Nurse

## (undated) DEVICE — FRCP BX RADJAW4 NDL 2.8 240 STD OG

## (undated) DEVICE — YANKAUER,BULB TIP WITH VENT: Brand: ARGYLE

## (undated) DEVICE — Device: Brand: DEFENDO AIR/WATER/SUCTION AND BIOPSY VALVE

## (undated) DEVICE — CUFF,BP,DISP,1 TUBE,ADULT,HP: Brand: MEDLINE

## (undated) DEVICE — CONMED SCOPE SAVER BITE BLOCK, 20X27 MM: Brand: SCOPE SAVER

## (undated) DEVICE — THE CHANNEL CLEANING BRUSH IS A NYLON FLEXI BRUSH ATTACHED TO A FLEXIBLE PLASTIC SHEATH DESIGNED TO SAFELY REMOVE DEBRIS FROM FLEXIBLE ENDOSCOPES.

## (undated) DEVICE — TBG SMPL FLTR LINE NASL 02/C02 A/ BX/100

## (undated) DEVICE — SENSR O2 OXIMAX FNGR A/ 18IN NONSTR